# Patient Record
Sex: MALE | Race: WHITE | NOT HISPANIC OR LATINO | URBAN - METROPOLITAN AREA
[De-identification: names, ages, dates, MRNs, and addresses within clinical notes are randomized per-mention and may not be internally consistent; named-entity substitution may affect disease eponyms.]

---

## 2019-04-21 ENCOUNTER — EMERGENCY (EMERGENCY)
Facility: HOSPITAL | Age: 74
LOS: 1 days | Discharge: HOME | End: 2019-04-21
Attending: EMERGENCY MEDICINE
Payer: MEDICARE

## 2019-04-21 VITALS
OXYGEN SATURATION: 99 % | DIASTOLIC BLOOD PRESSURE: 95 MMHG | TEMPERATURE: 98 F | HEIGHT: 66 IN | SYSTOLIC BLOOD PRESSURE: 150 MMHG | RESPIRATION RATE: 16 BRPM | HEART RATE: 100 BPM | WEIGHT: 210.1 LBS

## 2019-04-21 VITALS
RESPIRATION RATE: 18 BRPM | TEMPERATURE: 98 F | SYSTOLIC BLOOD PRESSURE: 144 MMHG | HEART RATE: 92 BPM | DIASTOLIC BLOOD PRESSURE: 85 MMHG | OXYGEN SATURATION: 98 %

## 2019-04-21 LAB
APPEARANCE UR: ABNORMAL
BILIRUB UR-MCNC: NEGATIVE — SIGNIFICANT CHANGE UP
COLOR SPEC: ABNORMAL
DIFF PNL FLD: ABNORMAL
GLUCOSE UR QL: 1000 MG/DL
KETONES UR-MCNC: NEGATIVE — SIGNIFICANT CHANGE UP
LEUKOCYTE ESTERASE UR-ACNC: ABNORMAL
NITRITE UR-MCNC: NEGATIVE — SIGNIFICANT CHANGE UP
PH UR: 5 — SIGNIFICANT CHANGE UP (ref 5–8)
PROT UR-MCNC: 100
SP GR SPEC: 1.02 — SIGNIFICANT CHANGE UP (ref 1.01–1.02)
UROBILINOGEN FLD QL: NEGATIVE — SIGNIFICANT CHANGE UP

## 2019-04-21 PROCEDURE — 99282 EMERGENCY DEPT VISIT SF MDM: CPT | Mod: 25

## 2019-04-21 PROCEDURE — 87086 URINE CULTURE/COLONY COUNT: CPT

## 2019-04-21 PROCEDURE — 81001 URINALYSIS AUTO W/SCOPE: CPT

## 2019-04-21 PROCEDURE — 99283 EMERGENCY DEPT VISIT LOW MDM: CPT

## 2019-04-21 NOTE — ED PROVIDER NOTE - OBJECTIVE STATEMENT
73 y/o M with a significant PMHx of BPH, CKD, DM, HLD and no significant PSHx presents to the ED with complaints of hematuria x today. Patient states he went to his urologist 3 days ago and had a "heat" procedure performed to shrink his prostate; patient was discharged with Diop catheter. Patient reports initial burning by penis however catheter was working normally. Patient states he noticed minimal urine leaking around catheter yesterday. Patient then notes that tonight urine was only leaking around catheter; patient pulled catheter out due to frustration, causing bleeding and slight discomfort by urethra. Patient states he has a 2 day follow up with urologist; was supposed to have catheter removed then. Denies abdominal pain, difficulty urinating, back pain, fever, testicular pain or any other complaints.

## 2019-04-21 NOTE — ED ADULT NURSE NOTE - CHIEF COMPLAINT QUOTE
biba ambulatory from [facility] with c/o some minor bleeding when urinating,he pulled out the pugh catheter

## 2019-04-21 NOTE — ED PROVIDER NOTE - PMH
BPH (benign prostatic hyperplasia)    CKD (chronic kidney disease)    Diabetes    HLD (hyperlipidemia)

## 2019-04-21 NOTE — ED ADULT NURSE REASSESSMENT NOTE - NS ED NURSE REASSESS COMMENT FT1
Patient for discharge back to [facility], [facility] notified,  Bernadette notified, no manager and no nurse on site at this time until the morning as per Bernadette.

## 2019-04-21 NOTE — ED PROVIDER NOTE - CLINICAL SUMMARY MEDICAL DECISION MAKING FREE TEXT BOX
73 y/o M presents to the ED with hematuria after pulling out Diop catheter. Will order UA and observe patient to make sure he is able to urinate. 73 y/o M presents to the ED with hematuria after pulling out Pugh catheter. Will order UA and observe patient to make sure he is able to urinate.  ua with blood but no uti. pt observed in ed, able to urinate without issue. no palpable bladder, no urgency. at this time will not place another pugh since pt with trauma from removing it with balloon inflated. has f/u appt with urology in 2 days. advised to return for signs of urinary retention.

## 2019-04-22 LAB
CULTURE RESULTS: SIGNIFICANT CHANGE UP
SPECIMEN SOURCE: SIGNIFICANT CHANGE UP

## 2021-01-22 PROBLEM — N40.0 BENIGN PROSTATIC HYPERPLASIA WITHOUT LOWER URINARY TRACT SYMPTOMS: Chronic | Status: ACTIVE | Noted: 2019-04-21

## 2021-01-22 PROBLEM — N18.9 CHRONIC KIDNEY DISEASE, UNSPECIFIED: Chronic | Status: ACTIVE | Noted: 2019-04-21

## 2021-01-22 PROBLEM — E78.5 HYPERLIPIDEMIA, UNSPECIFIED: Chronic | Status: ACTIVE | Noted: 2019-04-21

## 2021-01-22 PROBLEM — E11.9 TYPE 2 DIABETES MELLITUS WITHOUT COMPLICATIONS: Chronic | Status: ACTIVE | Noted: 2019-04-21

## 2021-01-26 ENCOUNTER — OUTPATIENT (OUTPATIENT)
Dept: OUTPATIENT SERVICES | Facility: HOSPITAL | Age: 76
LOS: 1 days | End: 2021-01-26
Payer: MEDICARE

## 2021-01-26 ENCOUNTER — APPOINTMENT (OUTPATIENT)
Dept: WOUND CARE | Facility: CLINIC | Age: 76
End: 2021-01-26

## 2021-01-26 VITALS — BODY MASS INDEX: 32.95 KG/M2 | WEIGHT: 205 LBS | HEIGHT: 66 IN

## 2021-01-26 VITALS
OXYGEN SATURATION: 97 % | TEMPERATURE: 97.7 F | HEART RATE: 82 BPM | SYSTOLIC BLOOD PRESSURE: 130 MMHG | RESPIRATION RATE: 18 BRPM | DIASTOLIC BLOOD PRESSURE: 83 MMHG

## 2021-01-26 DIAGNOSIS — E11.9 TYPE 2 DIABETES MELLITUS W/OUT COMPLICATIONS: ICD-10-CM

## 2021-01-26 DIAGNOSIS — L97.529 NON-PRESSURE CHRONIC ULCER OF OTHER PART OF LEFT FOOT WITH UNSPECIFIED SEVERITY: ICD-10-CM

## 2021-01-26 DIAGNOSIS — N40.0 BENIGN PROSTATIC HYPERPLASIA WITHOUT LOWER URINARY TRACT SYMPMS: ICD-10-CM

## 2021-01-26 DIAGNOSIS — Z78.9 OTHER SPECIFIED HEALTH STATUS: ICD-10-CM

## 2021-01-26 DIAGNOSIS — I10 ESSENTIAL (PRIMARY) HYPERTENSION: ICD-10-CM

## 2021-01-26 DIAGNOSIS — Z82.49 FAMILY HISTORY OF ISCHEMIC HEART DISEASE AND OTHER DISEASES OF THE CIRCULATORY SYSTEM: ICD-10-CM

## 2021-01-26 DIAGNOSIS — Z00.00 ENCOUNTER FOR GENERAL ADULT MEDICAL EXAMINATION WITHOUT ABNORMAL FINDINGS: ICD-10-CM

## 2021-01-26 PROCEDURE — G0463: CPT

## 2021-01-26 RX ORDER — FINASTERIDE 5 MG/1
5 TABLET, FILM COATED ORAL
Refills: 0 | Status: ACTIVE | COMMUNITY

## 2021-01-26 RX ORDER — AMLODIPINE BESYLATE 10 MG/1
10 TABLET ORAL
Refills: 0 | Status: ACTIVE | COMMUNITY

## 2021-01-26 RX ORDER — DOCUSATE SODIUM 100 MG/1
100 CAPSULE ORAL
Refills: 0 | Status: ACTIVE | COMMUNITY

## 2021-01-26 RX ORDER — SIMVASTATIN 40 MG/1
40 TABLET, FILM COATED ORAL
Refills: 0 | Status: ACTIVE | COMMUNITY

## 2021-01-26 RX ORDER — FLUTICASONE PROPIONATE 50 UG/1
SPRAY, METERED NASAL
Refills: 0 | Status: ACTIVE | COMMUNITY

## 2021-01-26 RX ORDER — ACETAMINOPHEN 325 MG/1
325 TABLET, FILM COATED ORAL
Refills: 0 | Status: ACTIVE | COMMUNITY

## 2021-01-26 RX ORDER — TAMSULOSIN HYDROCHLORIDE 0.4 MG/1
CAPSULE ORAL
Refills: 0 | Status: ACTIVE | COMMUNITY

## 2021-01-26 RX ORDER — UBIDECARENONE/VIT E ACET 100MG-5
25 MCG CAPSULE ORAL
Refills: 0 | Status: ACTIVE | COMMUNITY

## 2021-01-26 RX ORDER — GLIPIZIDE 2.5 MG/1
TABLET ORAL
Refills: 0 | Status: ACTIVE | COMMUNITY

## 2021-01-26 RX ORDER — DOXAZOSIN 4 MG/1
4 TABLET ORAL
Refills: 0 | Status: ACTIVE | COMMUNITY

## 2021-01-26 NOTE — ASSESSMENT
[FreeTextEntry1] : Physical exam:\par Vascular: DP palpable, PT nonpalpable bilaterally. Absent pedal hair. \par Derm: Over the right 3rd nail bed, extending distally, there is a .7 x 1.0 cm wound, granular, with normokeratotic wound edges. No drainage noted. After shave biopsy taken, healthy bleeding noted. 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

## 2021-01-26 NOTE — HISTORY OF PRESENT ILLNESS
[FreeTextEntry1] : 75 year old patient with history of DMII, HTN presents with complaint of wound over the nail bed of the left 3rd nail. He states the wound has been present for 1 year. He normally goes to podiatrist Dr. Erazo for regular care, and was referred here for possible biopsy. He states that the wound first appeared after he cut his nails by himself. He states that the area is mildly painful. Denies any other symptoms including fevers, chills, nausea, or vomiting. He notes that his sugars have been running high lately, in the 200's

## 2021-01-27 DIAGNOSIS — L97.522 NON-PRESSURE CHRONIC ULCER OF OTHER PART OF LEFT FOOT WITH FAT LAYER EXPOSED: ICD-10-CM

## 2021-01-27 DIAGNOSIS — I87.2 VENOUS INSUFFICIENCY (CHRONIC) (PERIPHERAL): ICD-10-CM

## 2021-02-02 ENCOUNTER — APPOINTMENT (OUTPATIENT)
Dept: WOUND CARE | Facility: CLINIC | Age: 76
End: 2021-02-02

## 2021-02-09 ENCOUNTER — APPOINTMENT (OUTPATIENT)
Dept: WOUND CARE | Facility: CLINIC | Age: 76
End: 2021-02-09

## 2021-02-09 ENCOUNTER — OUTPATIENT (OUTPATIENT)
Dept: OUTPATIENT SERVICES | Facility: HOSPITAL | Age: 76
LOS: 1 days | End: 2021-02-09
Payer: MEDICARE

## 2021-02-09 VITALS
OXYGEN SATURATION: 97 % | DIASTOLIC BLOOD PRESSURE: 102 MMHG | WEIGHT: 205 LBS | RESPIRATION RATE: 18 BRPM | HEART RATE: 106 BPM | HEIGHT: 66 IN | TEMPERATURE: 97 F | SYSTOLIC BLOOD PRESSURE: 164 MMHG | BODY MASS INDEX: 32.95 KG/M2

## 2021-02-09 DIAGNOSIS — Z00.00 ENCOUNTER FOR GENERAL ADULT MEDICAL EXAMINATION WITHOUT ABNORMAL FINDINGS: ICD-10-CM

## 2021-02-09 PROCEDURE — G0463: CPT

## 2021-02-09 NOTE — HISTORY OF PRESENT ILLNESS
[FreeTextEntry1] : 75 year old patient with history of DMII, HTN presents with complaint of wound over the nail bed of the left 3rd nail. He states the wound has been present for 1 year. He normally goes to podiatrist Dr. Erazo for regular care, and was referred here for possible biopsy. He states that the wound first appeared after he cut his nails by himself. He states that the area is mildly painful. Denies any other symptoms including fevers, chills, nausea, or vomiting. He notes that his sugars have been running high lately, in the 200's. shave biopsy results showed some signs of invasive squamous cell carcinoma, but pt in need of punch biopsy.

## 2021-02-09 NOTE — ASSESSMENT
[FreeTextEntry1] : Physical exam:\par Vascular: DP palpable, PT nonpalpable bilaterally. Absent pedal hair. \par Derm: Over the right 3rd nail bed, extending distally, there is a .7 x 1.0 cm wound, granular, with normokeratotic wound edges. No drainage noted. After shave biopsy taken, healthy bleeding noted. No other open wounds\par Neuro: Protective sensation intact\par MSK: able to move all other extremities \par \par Assessment: \par DMII ulceration, r/o possible SCC vs BCC\par \par Plan:\par Patient evaluated, chart reviewed\par Discussed with patient possible etiologies of his wound\par consent obtained for punch biopsy of wound\par Punch biopsy taken of right 3rd digit using disposable biopsy punch placed into specimen cup with formalin.\par Dressed right foot using DSD and ACE compression\par Patient to RTC in 1 week

## 2021-02-10 DIAGNOSIS — E11.622 TYPE 2 DIABETES MELLITUS WITH OTHER SKIN ULCER: ICD-10-CM

## 2021-02-10 DIAGNOSIS — L97.522 NON-PRESSURE CHRONIC ULCER OF OTHER PART OF LEFT FOOT WITH FAT LAYER EXPOSED: ICD-10-CM

## 2021-02-16 LAB — CORE LAB BIOPSY: NORMAL

## 2021-02-23 ENCOUNTER — APPOINTMENT (OUTPATIENT)
Dept: WOUND CARE | Facility: CLINIC | Age: 76
End: 2021-02-23

## 2021-02-23 ENCOUNTER — OUTPATIENT (OUTPATIENT)
Dept: OUTPATIENT SERVICES | Facility: HOSPITAL | Age: 76
LOS: 1 days | End: 2021-02-23
Payer: MEDICARE

## 2021-02-23 VITALS
DIASTOLIC BLOOD PRESSURE: 74 MMHG | TEMPERATURE: 98 F | RESPIRATION RATE: 18 BRPM | OXYGEN SATURATION: 98 % | WEIGHT: 205 LBS | HEIGHT: 66 IN | HEART RATE: 95 BPM | BODY MASS INDEX: 32.95 KG/M2 | SYSTOLIC BLOOD PRESSURE: 112 MMHG

## 2021-02-23 DIAGNOSIS — Z00.00 ENCOUNTER FOR GENERAL ADULT MEDICAL EXAMINATION WITHOUT ABNORMAL FINDINGS: ICD-10-CM

## 2021-02-23 PROCEDURE — G0463: CPT

## 2021-02-23 NOTE — HISTORY OF PRESENT ILLNESS
[FreeTextEntry1] : 75 year old patient with history of DMII, HTN presents with complaint of wound over the nail bed of the left 3rd nail. He states the wound has been present for 1 year. He normally goes to podiatrist Dr. Erazo for regular care, and was referred here for possible biopsy. He states that the wound first appeared after he cut his nails by himself. He states that the area is mildly painful. Denies any other symptoms including fevers, chills, nausea, or vomiting. He notes that his sugars have been running high lately, in the 200's. shave biopsy results showed some signs of invasive squamous cell carcinoma, but pt in need of punch biopsy which was performed and results were discussed with pt on this visit. Pt was referred to oncologist on this visit.

## 2021-02-23 NOTE — ASSESSMENT
[FreeTextEntry1] : Physical exam:\par Vascular: DP palpable, PT nonpalpable bilaterally. Absent pedal hair. \par Derm: Over the right 3rd nail bed, extending distally, there is a .7 x 1.0 cm wound, granular, with normokeratotic wound edges. No drainage noted.  No other open wounds\par Neuro: Protective sensation intact\par MSK: able to move all other extremities \par \par Assessment: \par DMII ulceration, r/o possible SCC vs BCC\par \par Plan:\par Patient evaluated, chart reviewed\par Discussed result of biopsy \par Reffered to Oncologist Dr Sue\par Dressed right foot using DSD and ACE compression\par Patient to RTC after seeing Dr Seu\par \par RTC:2 weeks

## 2021-02-24 DIAGNOSIS — C44.92 SQUAMOUS CELL CARCINOMA OF SKIN, UNSPECIFIED: ICD-10-CM

## 2021-02-25 LAB — CORE LAB BIOPSY: NORMAL

## 2021-03-23 ENCOUNTER — APPOINTMENT (OUTPATIENT)
Dept: SURGICAL ONCOLOGY | Facility: CLINIC | Age: 76
End: 2021-03-23
Payer: MEDICARE

## 2021-03-23 VITALS
SYSTOLIC BLOOD PRESSURE: 153 MMHG | TEMPERATURE: 98.7 F | WEIGHT: 202 LBS | OXYGEN SATURATION: 96 % | BODY MASS INDEX: 32.47 KG/M2 | DIASTOLIC BLOOD PRESSURE: 92 MMHG | HEART RATE: 102 BPM | HEIGHT: 66 IN

## 2021-03-23 DIAGNOSIS — K40.90 UNILATERAL INGUINAL HERNIA, W/OUT OBSTRUCTION OR GANGRENE, NOT SPECIFIED AS RECURRENT: ICD-10-CM

## 2021-03-23 PROCEDURE — 99205 OFFICE O/P NEW HI 60 MIN: CPT

## 2021-03-23 NOTE — PHYSICAL EXAM
[Normal] : supple, no neck mass and thyroid not enlarged [Normal Groin Lymph Nodes] : normal groin lymph nodes [Normal] : oriented to person, place and time, with appropriate affect [de-identified] : Normal popliteal nodes [de-identified] : left 3rd toe with non-healing ulcer from SSC biopsy

## 2021-03-23 NOTE — CONSULT LETTER
[Dear  ___] : Dear  [unfilled], [Consult Letter:] : I had the pleasure of evaluating your patient, [unfilled]. [Please see my note below.] : Please see my note below. [Consult Closing:] : Thank you very much for allowing me to participate in the care of this patient.  If you have any questions, please do not hesitate to contact me. [Sincerely,] : Sincerely, [FreeTextEntry2] : Usman Carballo MD  [FreeTextEntry1] : I will notify you of the date of the surgery so that you can see him for pre-operative evaluation. [FreeTextEntry3] : Hamilton Altamirano MD FACS\par Chief of Surgical Oncology\par \par

## 2021-03-23 NOTE — ASSESSMENT
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 \par \par \par PLAN:\par left 3rd toe amputation

## 2021-03-23 NOTE — HISTORY OF PRESENT ILLNESS
[de-identified] : Travis Greenberg is a 76 year old male who presents today for initial consultation. He was referred by Dr. Usman Carballo. 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 His past medical history includes BPH, DM II-insulin dependent, hypertension, inguinal hernia x 2, and hyperlipidemia.  He was recently hospitalized due to an unresponsive hypoglycemic episode. He currently receiving  wound care daily. He denies a personal or family history of cancer. \par \par Referring MD:  Usman Carballo MD

## 2021-04-30 ENCOUNTER — OUTPATIENT (OUTPATIENT)
Dept: OUTPATIENT SERVICES | Facility: HOSPITAL | Age: 76
LOS: 1 days | End: 2021-04-30
Payer: MEDICARE

## 2021-04-30 VITALS
HEIGHT: 65 IN | RESPIRATION RATE: 18 BRPM | WEIGHT: 203.93 LBS | DIASTOLIC BLOOD PRESSURE: 80 MMHG | OXYGEN SATURATION: 97 % | TEMPERATURE: 99 F | HEART RATE: 86 BPM | SYSTOLIC BLOOD PRESSURE: 137 MMHG

## 2021-04-30 DIAGNOSIS — E11.9 TYPE 2 DIABETES MELLITUS WITHOUT COMPLICATIONS: ICD-10-CM

## 2021-04-30 DIAGNOSIS — Z01.818 ENCOUNTER FOR OTHER PREPROCEDURAL EXAMINATION: ICD-10-CM

## 2021-04-30 DIAGNOSIS — Z98.890 OTHER SPECIFIED POSTPROCEDURAL STATES: Chronic | ICD-10-CM

## 2021-04-30 DIAGNOSIS — C44.729 SQUAMOUS CELL CARCINOMA OF SKIN OF LEFT LOWER LIMB, INCLUDING HIP: ICD-10-CM

## 2021-04-30 DIAGNOSIS — N18.9 CHRONIC KIDNEY DISEASE, UNSPECIFIED: ICD-10-CM

## 2021-04-30 DIAGNOSIS — J30.2 OTHER SEASONAL ALLERGIC RHINITIS: ICD-10-CM

## 2021-04-30 DIAGNOSIS — N40.0 BENIGN PROSTATIC HYPERPLASIA WITHOUT LOWER URINARY TRACT SYMPTOMS: ICD-10-CM

## 2021-04-30 DIAGNOSIS — M25.561 PAIN IN RIGHT KNEE: ICD-10-CM

## 2021-04-30 DIAGNOSIS — M54.5 LOW BACK PAIN: ICD-10-CM

## 2021-04-30 DIAGNOSIS — C44.92 SQUAMOUS CELL CARCINOMA OF SKIN, UNSPECIFIED: ICD-10-CM

## 2021-04-30 DIAGNOSIS — I10 ESSENTIAL (PRIMARY) HYPERTENSION: ICD-10-CM

## 2021-04-30 LAB — BLD GP AB SCN SERPL QL: SIGNIFICANT CHANGE UP

## 2021-04-30 PROCEDURE — 86900 BLOOD TYPING SEROLOGIC ABO: CPT

## 2021-04-30 PROCEDURE — 86901 BLOOD TYPING SEROLOGIC RH(D): CPT

## 2021-04-30 PROCEDURE — 36415 COLL VENOUS BLD VENIPUNCTURE: CPT

## 2021-04-30 PROCEDURE — 86850 RBC ANTIBODY SCREEN: CPT

## 2021-04-30 PROCEDURE — G0463: CPT

## 2021-04-30 NOTE — H&P PST ADULT - NSICDXFAMILYHX_GEN_ALL_CORE_FT
FAMILY HISTORY:  Father  Still living? Unknown  Family history of cardiovascular disease, Age at diagnosis: Age Unknown    Mother  Still living? Unknown  Family history of cardiovascular disease, Age at diagnosis: Age Unknown    Sibling  Still living? Unknown  Family history of cardiovascular disease, Age at diagnosis: Age Unknown

## 2021-04-30 NOTE — H&P PST ADULT - NEGATIVE NEUROLOGICAL SYMPTOMS
no weakness/no generalized seizures/no focal seizures/no syncope/no tremors/no vertigo/no difficulty walking

## 2021-04-30 NOTE — H&P PST ADULT - NSANTHOSAYNRD_GEN_A_CORE
No. ANTONY screening performed.  STOP BANG Legend: 0-2 = LOW Risk; 3-4 = INTERMEDIATE Risk; 5-8 = HIGH Risk

## 2021-04-30 NOTE — H&P PST ADULT - PRO PAIN EXPRESSION
Const: Denies fever, chills  HEENT: Denies blurry vision, sore throat  Neck: Denies neck pain/stiffness  Resp: Denies coughing, SOB  Cardiovascular: Denies CP, palpitations, LE edema  GI: Denies (+) nausea, vomiting, diarrhea, abdominal pain, Denies constipation, blood in stool  : Denies urinary frequency/urgency/dysuria, hematuria  MSK: Denies back pain  Neuro: Denies HA, dizziness, numbness, weakness  Skin: Denies rashes. Const: Denies fever, chills  HEENT: Denies blurry vision, sore throat  Neck: Denies neck pain/stiffness  Resp: Denies coughing, SOB  Cardiovascular: Denies CP, palpitations, LE edema  GI: Denies (+) nausea, + vomiting, + diarrhea, + abdominal pain, Denies constipation, blood in stool  : Denies urinary frequency/urgency/dysuria, hematuria  MSK: Denies back pain  Neuro: Denies HA, dizziness, numbness, weakness  Skin: Denies rashes. verbalization

## 2021-04-30 NOTE — H&P PST ADULT - HISTORY OF PRESENT ILLNESS
76 yr old male with PMH of Diabetes, HTN,  76 yr old male with PMH of anxiety, BPH, Diabetes, HTN, Hyperlipidemia, CKD, low back pain, OA of knees, bilateral inguinal hernia presents with c/o left 3rd toe nail cancer. Pt reports that he had a wound over the nail bed of the 3rd toe for about 1 year that never healed. Wound appeared after pt cut his nails by himself. Biopsy of wound revealed squamous cell carcinoma. Pt is scheduled for left 3rd toe amputation on 05/10/2021.

## 2021-04-30 NOTE — H&P PST ADULT - NEGATIVE GASTROINTESTINAL SYMPTOMS
no nausea/no vomiting/no diarrhea/no change in bowel habits/no flatulence/no abdominal pain/no melena

## 2021-04-30 NOTE — H&P PST ADULT - NSICDXPASTSURGICALHX_GEN_ALL_CORE_FT
PAST SURGICAL HISTORY:  History of biopsy shave biopsy of toenail on 1/26/2021 and punch biopsy on 2/9/2021    History of prostate biopsy

## 2021-04-30 NOTE — H&P PST ADULT - NSICDXPROBLEM_GEN_ALL_CORE_FT
PROBLEM DIAGNOSES  Problem: SCC (squamous cell carcinoma)  Assessment and Plan: Left 3rd toe amputation on 05/01/2021. Preoperative instructions discussed with pt and given to pt. Instructed pt that no one will be allowed to come to hospital with him, to notify security when he arrives in the lobby of the hospital that he is here for surgery, that he will need someone to come to the hospital to pick him up after surgery,not to eat or drink anything after midnight the night before the surgery, to avoid NSAIDs such as Ibuprofen, Motrin, Aleve, Advil, naproxen before surgery, to take Tylenol if needed for pain, to report if he has been exposed to anyone with any contagious diseases including Covid-19 or if he is exhibiting any symptoms of COVID-19,  to keep appointment for COVID-19  test 3 days before surgery. Instructed about use of Chlorhexidine 4% soap before surgery. Verbalized understanding of instructions given.     Problem: HTN (hypertension)  Assessment and Plan: Instructed to continue amlodipine and take with sips of water on day of surgery.  Cleared by PCP. Follow-up with PCP for management.     Problem: DM (diabetes mellitus)  Assessment and Plan: Instructed to continue Glipizide and to hold on day of surgery, to take Levemir on day of surgery. Perioperative glucose monitoring and coverage as needed. Follow-up with PCP for diabetic management     Problem: CKD (chronic kidney disease)  Assessment and Plan: Avoid NSAIDs perioperativetively.     Problem: Seasonal allergies  Assessment and Plan: Instructed to continue flonase as needed and to follow-up with PCP for allergy management.     Problem: Low back pain  Assessment and Plan: Pt instructed to avoid NSAIDs 1 week before surgery.  Advised to take Tylenol as needed for pain. Stated understanding of instructions given.     Problem: Pain in both knees  Assessment and Plan: Pt instructed to avoid NSAIDs 1 week before surgery.  Advised to take Tylenol as needed for pain. Stated understanding of instructions given.     Problem: BPH (benign prostatic hyperplasia)  Assessment and Plan: Instructed to continue meds and take Finasteride with sips of water on day of surgery. Follow-up with provider for management.

## 2021-04-30 NOTE — H&P PST ADULT - NSICDXPASTMEDICALHX_GEN_ALL_CORE_FT
PAST MEDICAL HISTORY:  BPH (benign prostatic hyperplasia)     CKD (chronic kidney disease)     Diabetes     HLD (hyperlipidemia)     HTN (hypertension)

## 2021-05-07 ENCOUNTER — APPOINTMENT (OUTPATIENT)
Dept: DISASTER EMERGENCY | Facility: CLINIC | Age: 76
End: 2021-05-07

## 2021-05-08 LAB — SARS-COV-2 N GENE NPH QL NAA+PROBE: NOT DETECTED

## 2021-05-09 ENCOUNTER — TRANSCRIPTION ENCOUNTER (OUTPATIENT)
Age: 76
End: 2021-05-09

## 2021-05-10 ENCOUNTER — OUTPATIENT (OUTPATIENT)
Dept: OUTPATIENT SERVICES | Facility: HOSPITAL | Age: 76
LOS: 1 days | End: 2021-05-10
Payer: MEDICARE

## 2021-05-10 ENCOUNTER — APPOINTMENT (OUTPATIENT)
Dept: SURGICAL ONCOLOGY | Facility: HOSPITAL | Age: 76
End: 2021-05-10

## 2021-05-10 VITALS
HEART RATE: 107 BPM | TEMPERATURE: 98 F | RESPIRATION RATE: 18 BRPM | WEIGHT: 203.93 LBS | DIASTOLIC BLOOD PRESSURE: 72 MMHG | SYSTOLIC BLOOD PRESSURE: 124 MMHG | HEIGHT: 65 IN | OXYGEN SATURATION: 99 %

## 2021-05-10 DIAGNOSIS — Z98.890 OTHER SPECIFIED POSTPROCEDURAL STATES: Chronic | ICD-10-CM

## 2021-05-10 DIAGNOSIS — C44.729 SQUAMOUS CELL CARCINOMA OF SKIN OF LEFT LOWER LIMB, INCLUDING HIP: ICD-10-CM

## 2021-05-10 LAB
BLD GP AB SCN SERPL QL: SIGNIFICANT CHANGE UP
GLUCOSE BLDC GLUCOMTR-MCNC: 324 MG/DL — HIGH (ref 70–99)
GLUCOSE BLDC GLUCOMTR-MCNC: 339 MG/DL — HIGH (ref 70–99)
GLUCOSE BLDC GLUCOMTR-MCNC: 339 MG/DL — HIGH (ref 70–99)
GLUCOSE BLDC GLUCOMTR-MCNC: 367 MG/DL — HIGH (ref 70–99)
GLUCOSE BLDC GLUCOMTR-MCNC: 370 MG/DL — HIGH (ref 70–99)

## 2021-05-10 PROCEDURE — 86900 BLOOD TYPING SEROLOGIC ABO: CPT

## 2021-05-10 PROCEDURE — 86901 BLOOD TYPING SEROLOGIC RH(D): CPT

## 2021-05-10 PROCEDURE — 36415 COLL VENOUS BLD VENIPUNCTURE: CPT

## 2021-05-10 PROCEDURE — 82962 GLUCOSE BLOOD TEST: CPT

## 2021-05-10 PROCEDURE — 28810 AMPUTATION TOE & METATARSAL: CPT | Mod: T2,73

## 2021-05-10 PROCEDURE — 86850 RBC ANTIBODY SCREEN: CPT

## 2021-05-10 RX ORDER — GLUCAGON INJECTION, SOLUTION 0.5 MG/.1ML
1 INJECTION, SOLUTION SUBCUTANEOUS ONCE
Refills: 0 | Status: DISCONTINUED | OUTPATIENT
Start: 2021-05-10 | End: 2021-05-17

## 2021-05-10 RX ORDER — SODIUM CHLORIDE 9 MG/ML
3 INJECTION INTRAMUSCULAR; INTRAVENOUS; SUBCUTANEOUS EVERY 8 HOURS
Refills: 0 | Status: DISCONTINUED | OUTPATIENT
Start: 2021-05-10 | End: 2021-05-10

## 2021-05-10 RX ORDER — DEXTROSE 50 % IN WATER 50 %
12.5 SYRINGE (ML) INTRAVENOUS ONCE
Refills: 0 | Status: DISCONTINUED | OUTPATIENT
Start: 2021-05-10 | End: 2021-05-17

## 2021-05-10 RX ORDER — ALBUTEROL 90 UG/1
2 AEROSOL, METERED ORAL
Qty: 0 | Refills: 0 | DISCHARGE

## 2021-05-10 RX ORDER — INSULIN LISPRO 100/ML
10 VIAL (ML) SUBCUTANEOUS
Refills: 0 | Status: DISCONTINUED | OUTPATIENT
Start: 2021-05-10 | End: 2021-05-17

## 2021-05-10 RX ORDER — DEXTROSE 50 % IN WATER 50 %
25 SYRINGE (ML) INTRAVENOUS ONCE
Refills: 0 | Status: DISCONTINUED | OUTPATIENT
Start: 2021-05-10 | End: 2021-05-17

## 2021-05-10 RX ORDER — INSULIN GLARGINE 100 [IU]/ML
20 INJECTION, SOLUTION SUBCUTANEOUS ONCE
Refills: 0 | Status: COMPLETED | OUTPATIENT
Start: 2021-05-10 | End: 2021-05-10

## 2021-05-10 RX ORDER — INSULIN LISPRO 100/ML
VIAL (ML) SUBCUTANEOUS
Refills: 0 | Status: DISCONTINUED | OUTPATIENT
Start: 2021-05-10 | End: 2021-05-17

## 2021-05-10 RX ORDER — INSULIN HUMAN 100 [IU]/ML
10 INJECTION, SOLUTION SUBCUTANEOUS ONCE
Refills: 0 | Status: COMPLETED | OUTPATIENT
Start: 2021-05-10 | End: 2021-05-10

## 2021-05-10 RX ORDER — SODIUM CHLORIDE 9 MG/ML
1000 INJECTION, SOLUTION INTRAVENOUS
Refills: 0 | Status: DISCONTINUED | OUTPATIENT
Start: 2021-05-10 | End: 2021-05-17

## 2021-05-10 RX ORDER — DEXTROSE 50 % IN WATER 50 %
15 SYRINGE (ML) INTRAVENOUS ONCE
Refills: 0 | Status: DISCONTINUED | OUTPATIENT
Start: 2021-05-10 | End: 2021-05-17

## 2021-05-10 RX ORDER — INSULIN DETEMIR 100/ML (3)
20 INSULIN PEN (ML) SUBCUTANEOUS ONCE
Refills: 0 | Status: DISCONTINUED | OUTPATIENT
Start: 2021-05-10 | End: 2021-05-10

## 2021-05-10 RX ADMIN — SODIUM CHLORIDE 3 MILLILITER(S): 9 INJECTION INTRAMUSCULAR; INTRAVENOUS; SUBCUTANEOUS at 09:19

## 2021-05-10 RX ADMIN — INSULIN GLARGINE 20 UNIT(S): 100 INJECTION, SOLUTION SUBCUTANEOUS at 10:45

## 2021-05-10 RX ADMIN — INSULIN HUMAN 10 UNIT(S): 100 INJECTION, SOLUTION SUBCUTANEOUS at 09:29

## 2021-05-10 NOTE — ASU PATIENT PROFILE, ADULT - PMH
BPH (benign prostatic hyperplasia)    CKD (chronic kidney disease)    Diabetes    HLD (hyperlipidemia)    HTN (hypertension)

## 2021-05-10 NOTE — PACU DISCHARGE NOTE - COMMENTS
Patient blood sugar 339 on presentation to the hospital and went up to 370. Patient received regular insulin 10 and lantus 20 units. Patient's blood sugar is 320 now and is trending down. He is stable to be discharged to his assisted care facility.

## 2021-05-10 NOTE — ASU PATIENT PROFILE, ADULT - PSH
History of biopsy  shave biopsy of toenail on 1/26/2021 and punch biopsy on 2/9/2021  History of prostate biopsy

## 2021-05-13 DIAGNOSIS — Z01.818 ENCOUNTER FOR OTHER PREPROCEDURAL EXAMINATION: ICD-10-CM

## 2021-05-13 PROBLEM — I10 ESSENTIAL (PRIMARY) HYPERTENSION: Chronic | Status: ACTIVE | Noted: 2021-04-30

## 2021-05-14 ENCOUNTER — APPOINTMENT (OUTPATIENT)
Dept: DISASTER EMERGENCY | Facility: CLINIC | Age: 76
End: 2021-05-14

## 2021-05-15 LAB — SARS-COV-2 N GENE NPH QL NAA+PROBE: NOT DETECTED

## 2021-05-16 ENCOUNTER — TRANSCRIPTION ENCOUNTER (OUTPATIENT)
Age: 76
End: 2021-05-16

## 2021-05-17 ENCOUNTER — OUTPATIENT (OUTPATIENT)
Dept: OUTPATIENT SERVICES | Facility: HOSPITAL | Age: 76
LOS: 1 days | End: 2021-05-17
Payer: MEDICARE

## 2021-05-17 ENCOUNTER — APPOINTMENT (OUTPATIENT)
Dept: SURGICAL ONCOLOGY | Facility: HOSPITAL | Age: 76
End: 2021-05-17

## 2021-05-17 ENCOUNTER — RESULT REVIEW (OUTPATIENT)
Age: 76
End: 2021-05-17

## 2021-05-17 VITALS
HEART RATE: 95 BPM | OXYGEN SATURATION: 96 % | RESPIRATION RATE: 17 BRPM | SYSTOLIC BLOOD PRESSURE: 140 MMHG | DIASTOLIC BLOOD PRESSURE: 89 MMHG | TEMPERATURE: 98 F

## 2021-05-17 VITALS
RESPIRATION RATE: 16 BRPM | WEIGHT: 203.93 LBS | HEIGHT: 65 IN | OXYGEN SATURATION: 99 % | DIASTOLIC BLOOD PRESSURE: 92 MMHG | TEMPERATURE: 98 F | HEART RATE: 106 BPM | SYSTOLIC BLOOD PRESSURE: 147 MMHG

## 2021-05-17 DIAGNOSIS — C44.729 SQUAMOUS CELL CARCINOMA OF SKIN OF LEFT LOWER LIMB, INCLUDING HIP: ICD-10-CM

## 2021-05-17 DIAGNOSIS — Z98.890 OTHER SPECIFIED POSTPROCEDURAL STATES: Chronic | ICD-10-CM

## 2021-05-17 LAB
GLUCOSE BLDC GLUCOMTR-MCNC: 222 MG/DL — HIGH (ref 70–99)
GLUCOSE BLDC GLUCOMTR-MCNC: 247 MG/DL — HIGH (ref 70–99)
GLUCOSE BLDC GLUCOMTR-MCNC: 274 MG/DL — HIGH (ref 70–99)
GLUCOSE BLDC GLUCOMTR-MCNC: 340 MG/DL — HIGH (ref 70–99)
GLUCOSE BLDC GLUCOMTR-MCNC: 354 MG/DL — HIGH (ref 70–99)

## 2021-05-17 PROCEDURE — 28820 AMPUTATION OF TOE: CPT | Mod: T2

## 2021-05-17 PROCEDURE — 82962 GLUCOSE BLOOD TEST: CPT

## 2021-05-17 PROCEDURE — 28047 RESECT FOOT/TOE TUMOR 3 CM/>: CPT

## 2021-05-17 PROCEDURE — 88305 TISSUE EXAM BY PATHOLOGIST: CPT

## 2021-05-17 PROCEDURE — 88305 TISSUE EXAM BY PATHOLOGIST: CPT | Mod: 26

## 2021-05-17 RX ORDER — OXYCODONE AND ACETAMINOPHEN 5; 325 MG/1; MG/1
1 TABLET ORAL EVERY 4 HOURS
Refills: 0 | Status: DISCONTINUED | OUTPATIENT
Start: 2021-05-17 | End: 2021-05-17

## 2021-05-17 RX ORDER — FENTANYL CITRATE 50 UG/ML
25 INJECTION INTRAVENOUS
Refills: 0 | Status: DISCONTINUED | OUTPATIENT
Start: 2021-05-17 | End: 2021-05-17

## 2021-05-17 RX ORDER — INSULIN LISPRO 100/ML
5 VIAL (ML) SUBCUTANEOUS ONCE
Refills: 0 | Status: COMPLETED | OUTPATIENT
Start: 2021-05-17 | End: 2021-05-17

## 2021-05-17 RX ORDER — SODIUM CHLORIDE 9 MG/ML
3 INJECTION INTRAMUSCULAR; INTRAVENOUS; SUBCUTANEOUS EVERY 8 HOURS
Refills: 0 | Status: DISCONTINUED | OUTPATIENT
Start: 2021-05-17 | End: 2021-05-17

## 2021-05-17 RX ADMIN — Medication 5 UNIT(S): at 10:29

## 2021-05-17 RX ADMIN — SODIUM CHLORIDE 3 MILLILITER(S): 9 INJECTION INTRAMUSCULAR; INTRAVENOUS; SUBCUTANEOUS at 10:29

## 2021-05-17 NOTE — ASU DISCHARGE PLAN (ADULT/PEDIATRIC) - ASU DC SPECIAL INSTRUCTIONSFT
- for postoperative pain, take over the counter pain medications (alternate tylenol and ibuprofen/motrin as needed every 4-6 hours as recommended on the bottle)

## 2021-05-17 NOTE — ASU DISCHARGE PLAN (ADULT/PEDIATRIC) - CARE PROVIDER_API CALL
Hamilton Altamirano)  Surgery  [address] [place] [address], [place] [address]  Phone: [phone]  Fax: [phone]  Established Patient  Follow Up Time: 2 weeks

## 2021-05-24 LAB — SURGICAL PATHOLOGY STUDY: SIGNIFICANT CHANGE UP

## 2021-06-01 ENCOUNTER — APPOINTMENT (OUTPATIENT)
Dept: SURGICAL ONCOLOGY | Facility: CLINIC | Age: 76
End: 2021-06-01
Payer: MEDICARE

## 2021-06-01 VITALS — TEMPERATURE: 96.2 F

## 2021-06-01 VITALS — HEART RATE: 99 BPM | DIASTOLIC BLOOD PRESSURE: 84 MMHG | SYSTOLIC BLOOD PRESSURE: 130 MMHG

## 2021-06-01 PROCEDURE — 99024 POSTOP FOLLOW-UP VISIT: CPT

## 2021-06-01 NOTE — CONSULT LETTER
[Dear  ___] : Dear  [unfilled], [Courtesy Letter:] : I had the pleasure of seeing your patient, [unfilled], in my office today. [Please see my note below.] : Please see my note below. [Consult Closing:] : Thank you very much for allowing me to participate in the care of this patient.  If you have any questions, please do not hesitate to contact me. [Sincerely,] : Sincerely, [FreeTextEntry1] : He will follow-up with both of us. [FreeTextEntry3] : Hamilton Altamirano MD FACS\par Chief of Surgical Oncology\par \par

## 2021-06-01 NOTE — HISTORY OF PRESENT ILLNESS
[de-identified] : Travis Greenberg is a 76 year old male who presents today for initial post operative visit. He is s/p radical resection of squamous cell carcinoma of left third toe  on 5/17/21. Final pathology revealed a diagnosis of invasive squamous cell carcinoma, well-differentiated, tumor extends to the reticular dermis, lympho-vascular permeation is not identified, margins of resection and underlying bone are uninvolved.\par \par Pertinent History: \par Nail bed wound, left 3rd toe biopsy 1/26/21: Highly atypical squamous cells in a minute fragment of ulcerated tissue with fibrinopurulent exudate.\par \par Left 3rd toe, punch biopsy 2/16/21: Squamous cell carcinoma, invasive, extending to all  margins.\par \par He recently presented to both podiatry and orthopedics for evaluation of a left 3rd toe ulceration, he states this has been present for approximately 1 year. His past medical history includes BPH, DM II-insulin dependent, hypertension, inguinal hernia x 2, and hyperlipidemia.  He was recently hospitalized due to an unresponsive hypoglycemic episode. He currently receiving  wound care daily. He denies a personal or family history of cancer. \par \par Referring MD:  Usman Carballo MD

## 2021-06-01 NOTE — REASON FOR VISIT
[Post-Op] : a post-op for [FreeTextEntry2] : s/p radical resection of squamous cell carcinoma of left third toe 5/17/21

## 2021-06-01 NOTE — ASSESSMENT
[FreeTextEntry1] : IMP:\par s/p radical resection of squamous cell carcinoma of left third toe  on 5/17/21\par PATH: invasive squamous cell carcinoma, well-differentiated, tumor extends to the reticular dermis, lympho-vascular permeation is not identified, margins of resection and underlying bone are uninvolved.\par \par \par PLAN:\par RTO 3 months

## 2021-09-14 ENCOUNTER — APPOINTMENT (OUTPATIENT)
Dept: SURGICAL ONCOLOGY | Facility: CLINIC | Age: 76
End: 2021-09-14
Payer: MEDICARE

## 2021-09-14 VITALS
BODY MASS INDEX: 32.14 KG/M2 | OXYGEN SATURATION: 99 % | DIASTOLIC BLOOD PRESSURE: 86 MMHG | WEIGHT: 200 LBS | HEART RATE: 101 BPM | TEMPERATURE: 97.6 F | SYSTOLIC BLOOD PRESSURE: 137 MMHG | HEIGHT: 66 IN

## 2021-09-14 PROCEDURE — 99214 OFFICE O/P EST MOD 30 MIN: CPT

## 2021-09-14 NOTE — HISTORY OF PRESENT ILLNESS
[de-identified] : Travis Greenberg is a 76 year old male who presents today for initial post operative visit. He is s/p radical resection of squamous cell carcinoma of left third toe  on 5/17/21. Final pathology revealed a diagnosis of invasive squamous cell carcinoma, well-differentiated, tumor extends to the reticular dermis, lymphovascular permeation is not identified, margins of resection and underlying bone are uninvolved.\par \par Pertinent History: \par Nail bed wound, left 3rd toe biopsy 1/26/21: Highly atypical squamous cells in a minute fragment of ulcerated tissue with fibrinopurulent exudate.\par \par Left 3rd toe, punch biopsy 2/16/21: Squamous cell carcinoma, invasive, extending to all  margins.\par \par He recently presented to both podiatry and orthopedics for evaluation of a left 3rd toe ulceration, he states this has been present for approximately 1 year. His past medical history includes BPH, DM II-insulin dependent, hypertension, inguinal hernia x 2, and hyperlipidemia.  He was recently hospitalized due to an unresponsive hypoglycemic episode. He currently receiving  wound care daily. He denies a personal or family history of cancer. \par \par Today, he states he is feeling well. He is able to ambulate well and has increased his activity level. \par \par Referring MD:  Usman Carballo MD

## 2021-09-14 NOTE — CONSULT LETTER
[Dear  ___] : Dear  [unfilled], [Courtesy Letter:] : I had the pleasure of seeing your patient, [unfilled], in my office today. [Consult Closing:] : Thank you very much for allowing me to participate in the care of this patient.  If you have any questions, please do not hesitate to contact me. [Please see my note below.] : Please see my note below. [Sincerely,] : Sincerely, [FreeTextEntry1] : He will follow-up with both of us. [FreeTextEntry3] : Hamilton Altamirano MD FACS\par Chief of Surgical Oncology\par \par

## 2021-09-14 NOTE — PHYSICAL EXAM
[Normal] : supple, no neck mass and thyroid not enlarged [Normal Groin Lymph Nodes] : normal groin lymph nodes [Normal] : oriented to person, place and time, with appropriate affect [de-identified] : Normal popliteal nodes [de-identified] : left 3rd toes amputation site clear

## 2021-09-14 NOTE — ASSESSMENT
[FreeTextEntry1] : IMP:\par s/p radical resection of squamous cell carcinoma of left third toe  on 5/17/21\par PATH: invasive squamous cell carcinoma, well-differentiated, tumor extends to the reticular dermis, lympho-vascular permeation is not identified, margins of resection and underlying bone are uninvolved.\par \par \par PLAN:\par RTO 3 months; then q 6 months

## 2021-09-14 NOTE — REASON FOR VISIT
[Follow-Up Visit] : a follow-up visit for [Skin Cancer] : skin caner [FreeTextEntry2] : s/p radical resection of squamous cell carcinoma of left third toe 5/17/21

## 2021-10-12 ENCOUNTER — INPATIENT (INPATIENT)
Facility: HOSPITAL | Age: 76
LOS: 2 days | Discharge: NURSING, REHAB OR LONG-TERM CARE | DRG: 312 | End: 2021-10-15
Attending: INTERNAL MEDICINE | Admitting: INTERNAL MEDICINE
Payer: MEDICARE

## 2021-10-12 VITALS
OXYGEN SATURATION: 100 % | HEART RATE: 110 BPM | DIASTOLIC BLOOD PRESSURE: 76 MMHG | SYSTOLIC BLOOD PRESSURE: 127 MMHG | TEMPERATURE: 98 F | WEIGHT: 190.04 LBS | HEIGHT: 65 IN | RESPIRATION RATE: 16 BRPM

## 2021-10-12 DIAGNOSIS — Z98.890 OTHER SPECIFIED POSTPROCEDURAL STATES: Chronic | ICD-10-CM

## 2021-10-12 LAB
BASOPHILS # BLD AUTO: 0.06 K/UL — SIGNIFICANT CHANGE UP (ref 0–0.2)
BASOPHILS NFR BLD AUTO: 0.5 % — SIGNIFICANT CHANGE UP (ref 0–2)
EOSINOPHIL # BLD AUTO: 0.03 K/UL — SIGNIFICANT CHANGE UP (ref 0–0.5)
EOSINOPHIL NFR BLD AUTO: 0.2 % — SIGNIFICANT CHANGE UP (ref 0–6)
HCT VFR BLD CALC: 44 % — SIGNIFICANT CHANGE UP (ref 39–50)
HGB BLD-MCNC: 14.4 G/DL — SIGNIFICANT CHANGE UP (ref 13–17)
IMM GRANULOCYTES NFR BLD AUTO: 0.5 % — SIGNIFICANT CHANGE UP (ref 0–1.5)
LYMPHOCYTES # BLD AUTO: 0.79 K/UL — LOW (ref 1–3.3)
LYMPHOCYTES # BLD AUTO: 6.1 % — LOW (ref 13–44)
MCHC RBC-ENTMCNC: 27.4 PG — SIGNIFICANT CHANGE UP (ref 27–34)
MCHC RBC-ENTMCNC: 32.7 GM/DL — SIGNIFICANT CHANGE UP (ref 32–36)
MCV RBC AUTO: 83.7 FL — SIGNIFICANT CHANGE UP (ref 80–100)
MONOCYTES # BLD AUTO: 0.63 K/UL — SIGNIFICANT CHANGE UP (ref 0–0.9)
MONOCYTES NFR BLD AUTO: 4.9 % — SIGNIFICANT CHANGE UP (ref 2–14)
NEUTROPHILS # BLD AUTO: 11.29 K/UL — HIGH (ref 1.8–7.4)
NEUTROPHILS NFR BLD AUTO: 87.8 % — HIGH (ref 43–77)
NRBC # BLD: 0 /100 WBCS — SIGNIFICANT CHANGE UP (ref 0–0)
PLATELET # BLD AUTO: 169 K/UL — SIGNIFICANT CHANGE UP (ref 150–400)
RBC # BLD: 5.26 M/UL — SIGNIFICANT CHANGE UP (ref 4.2–5.8)
RBC # FLD: 13.5 % — SIGNIFICANT CHANGE UP (ref 10.3–14.5)
WBC # BLD: 12.86 K/UL — HIGH (ref 3.8–10.5)
WBC # FLD AUTO: 12.86 K/UL — HIGH (ref 3.8–10.5)

## 2021-10-12 PROCEDURE — 71046 X-RAY EXAM CHEST 2 VIEWS: CPT | Mod: 26

## 2021-10-12 PROCEDURE — 93010 ELECTROCARDIOGRAM REPORT: CPT

## 2021-10-12 PROCEDURE — 99284 EMERGENCY DEPT VISIT MOD MDM: CPT

## 2021-10-12 RX ORDER — SODIUM CHLORIDE 9 MG/ML
1000 INJECTION INTRAMUSCULAR; INTRAVENOUS; SUBCUTANEOUS ONCE
Refills: 0 | Status: COMPLETED | OUTPATIENT
Start: 2021-10-12 | End: 2021-10-12

## 2021-10-12 RX ADMIN — SODIUM CHLORIDE 1000 MILLILITER(S): 9 INJECTION INTRAMUSCULAR; INTRAVENOUS; SUBCUTANEOUS at 23:58

## 2021-10-12 NOTE — ED PROVIDER NOTE - OBJECTIVE STATEMENT
76 yr old male from [facility] with hx of anxiety, BPH, Diabetes, HTN, Hyperlipidemia, CKD, low back pain, OA of knees, bilateral inguinal hernia presents to ed c/o near syncope around 7p. pt lasted a few seconds. pt took insulin at noon and had a small meal and since then hasn't eaten anything since. pt felt woozy and tired then almost passed out while at pharmacy. no cp, no headache, no fall, no abd pain, no n/v, no sob, no focal weakness, no incontience. pt asx now.

## 2021-10-12 NOTE — ED ADULT TRIAGE NOTE - CHIEF COMPLAINT QUOTE
BIBA for syncopal episode, presently a&ox3, ambulates steadily, denies any pain or any complaints, states maybe it was because his last meal was at noon, pt is diabetic, ems report BG=160, pt is a [facility] resident and was at a local store near residence when he syncopized, pt had his covid booster vaccine today

## 2021-10-12 NOTE — ED PROVIDER NOTE - PROGRESS NOTE DETAILS
patient signedout to me by Dr. Euceda, Awaiting labs.  labs show neg trop, ddimer 608, Cr 2 thus unable to obtain CTA  On reeval patient denies chest pain or dizziness at this time, on exam patient has persistent tachycardia 100-110bpm  Discussed above with Dr. Carballo who recommends admission under Dr. Phelan's service.  Discussed above with Dr. Phelan who agrees with plan for admission, recommends 1x dose of lovenox 60mg and VQ study later today. Tod FRANKEL

## 2021-10-12 NOTE — ED PROVIDER NOTE - NEUROLOGICAL, MLM
Alert and oriented, no focal deficits, no motor or sensory deficits. CN II-XIi intact, no dysmetria, non-ataxic gait

## 2021-10-12 NOTE — ED PROVIDER NOTE - CLINICAL SUMMARY MEDICAL DECISION MAKING FREE TEXT BOX
76 yr old male from [facility] with hx of anxiety, BPH, Diabetes, HTN, Hyperlipidemia, CKD, low back pain, OA of knees, bilateral inguinal hernia presents to ed c/o near syncope around 7p. pt lasted a few seconds. pt took insulin at noon and had a small meal and since then hasn't eaten anything since. pt felt woozy and tired then almost passed out while at pharmacy. no cp, no headache, no fall, no abd pain, no n/v, no sob, no focal weakness, no incontience. pt asx now.    near syncope r/o arrhythmia vs acs vs lytes imbalance vs hypoglycemia. no sign of seizure/cva/dehydration or drug induced near syncope. labs, ekg, cxr, fluids, re-assess

## 2021-10-13 ENCOUNTER — TRANSCRIPTION ENCOUNTER (OUTPATIENT)
Age: 76
End: 2021-10-13

## 2021-10-13 DIAGNOSIS — I10 ESSENTIAL (PRIMARY) HYPERTENSION: ICD-10-CM

## 2021-10-13 DIAGNOSIS — I82.409 ACUTE EMBOLISM AND THROMBOSIS OF UNSPECIFIED DEEP VEINS OF UNSPECIFIED LOWER EXTREMITY: ICD-10-CM

## 2021-10-13 DIAGNOSIS — R09.89 OTHER SPECIFIED SYMPTOMS AND SIGNS INVOLVING THE CIRCULATORY AND RESPIRATORY SYSTEMS: ICD-10-CM

## 2021-10-13 DIAGNOSIS — Z29.9 ENCOUNTER FOR PROPHYLACTIC MEASURES, UNSPECIFIED: ICD-10-CM

## 2021-10-13 DIAGNOSIS — F41.9 ANXIETY DISORDER, UNSPECIFIED: ICD-10-CM

## 2021-10-13 DIAGNOSIS — E11.9 TYPE 2 DIABETES MELLITUS WITHOUT COMPLICATIONS: ICD-10-CM

## 2021-10-13 DIAGNOSIS — R55 SYNCOPE AND COLLAPSE: ICD-10-CM

## 2021-10-13 DIAGNOSIS — N40.0 BENIGN PROSTATIC HYPERPLASIA WITHOUT LOWER URINARY TRACT SYMPTOMS: ICD-10-CM

## 2021-10-13 DIAGNOSIS — E78.5 HYPERLIPIDEMIA, UNSPECIFIED: ICD-10-CM

## 2021-10-13 DIAGNOSIS — N18.9 CHRONIC KIDNEY DISEASE, UNSPECIFIED: ICD-10-CM

## 2021-10-13 LAB
ALBUMIN SERPL ELPH-MCNC: 3.1 G/DL — LOW (ref 3.5–5)
ALBUMIN SERPL ELPH-MCNC: 3.6 G/DL — SIGNIFICANT CHANGE UP (ref 3.5–5)
ALBUMIN SERPL ELPH-MCNC: SIGNIFICANT CHANGE UP G/DL (ref 3.5–5)
ALP SERPL-CCNC: 85 U/L — SIGNIFICANT CHANGE UP (ref 40–120)
ALP SERPL-CCNC: 87 U/L — SIGNIFICANT CHANGE UP (ref 40–120)
ALP SERPL-CCNC: SIGNIFICANT CHANGE UP U/L (ref 40–120)
ALT FLD-CCNC: 21 U/L DA — SIGNIFICANT CHANGE UP (ref 10–60)
ALT FLD-CCNC: 23 U/L DA — SIGNIFICANT CHANGE UP (ref 10–60)
ALT FLD-CCNC: 24 U/L DA — SIGNIFICANT CHANGE UP (ref 10–60)
ANION GAP SERPL CALC-SCNC: 4 MMOL/L — LOW (ref 5–17)
ANION GAP SERPL CALC-SCNC: 8 MMOL/L — SIGNIFICANT CHANGE UP (ref 5–17)
APPEARANCE UR: CLEAR — SIGNIFICANT CHANGE UP
AST SERPL-CCNC: 17 U/L — SIGNIFICANT CHANGE UP (ref 10–40)
AST SERPL-CCNC: 17 U/L — SIGNIFICANT CHANGE UP (ref 10–40)
AST SERPL-CCNC: 22 U/L — SIGNIFICANT CHANGE UP (ref 10–40)
BACTERIA # UR AUTO: ABNORMAL /HPF
BASOPHILS # BLD AUTO: 0.04 K/UL — SIGNIFICANT CHANGE UP (ref 0–0.2)
BASOPHILS NFR BLD AUTO: 0.5 % — SIGNIFICANT CHANGE UP (ref 0–2)
BILIRUB SERPL-MCNC: 0.5 MG/DL — SIGNIFICANT CHANGE UP (ref 0.2–1.2)
BILIRUB SERPL-MCNC: 0.5 MG/DL — SIGNIFICANT CHANGE UP (ref 0.2–1.2)
BILIRUB SERPL-MCNC: SIGNIFICANT CHANGE UP MG/DL (ref 0.2–1.2)
BILIRUB UR-MCNC: NEGATIVE — SIGNIFICANT CHANGE UP
BUN SERPL-MCNC: 32 MG/DL — HIGH (ref 7–18)
BUN SERPL-MCNC: 38 MG/DL — HIGH (ref 7–18)
BUN SERPL-MCNC: SIGNIFICANT CHANGE UP MG/DL (ref 7–18)
CALCIUM SERPL-MCNC: 9.3 MG/DL — SIGNIFICANT CHANGE UP (ref 8.4–10.5)
CALCIUM SERPL-MCNC: 9.5 MG/DL — SIGNIFICANT CHANGE UP (ref 8.4–10.5)
CALCIUM SERPL-MCNC: SIGNIFICANT CHANGE UP MG/DL (ref 8.4–10.5)
CHLORIDE SERPL-SCNC: 108 MMOL/L — SIGNIFICANT CHANGE UP (ref 96–108)
CHLORIDE SERPL-SCNC: 110 MMOL/L — HIGH (ref 96–108)
CHLORIDE SERPL-SCNC: 110 MMOL/L — HIGH (ref 96–108)
CO2 SERPL-SCNC: 22 MMOL/L — SIGNIFICANT CHANGE UP (ref 22–31)
CO2 SERPL-SCNC: 26 MMOL/L — SIGNIFICANT CHANGE UP (ref 22–31)
CO2 SERPL-SCNC: SIGNIFICANT CHANGE UP MMOL/L (ref 22–31)
COLOR SPEC: YELLOW — SIGNIFICANT CHANGE UP
CREAT SERPL-MCNC: 2.02 MG/DL — HIGH (ref 0.5–1.3)
CREAT SERPL-MCNC: 2.18 MG/DL — HIGH (ref 0.5–1.3)
CREAT SERPL-MCNC: SIGNIFICANT CHANGE UP MG/DL (ref 0.5–1.3)
D DIMER BLD IA.RAPID-MCNC: 608 NG/ML DDU — HIGH
DIFF PNL FLD: ABNORMAL
EOSINOPHIL # BLD AUTO: 0.01 K/UL — SIGNIFICANT CHANGE UP (ref 0–0.5)
EOSINOPHIL NFR BLD AUTO: 0.1 % — SIGNIFICANT CHANGE UP (ref 0–6)
EPI CELLS # UR: SIGNIFICANT CHANGE UP /HPF
ERYTHROCYTE [SEDIMENTATION RATE] IN BLOOD: 17 MM/HR — SIGNIFICANT CHANGE UP (ref 0–20)
GLUCOSE BLDC GLUCOMTR-MCNC: 131 MG/DL — HIGH (ref 70–99)
GLUCOSE BLDC GLUCOMTR-MCNC: 157 MG/DL — HIGH (ref 70–99)
GLUCOSE BLDC GLUCOMTR-MCNC: 195 MG/DL — HIGH (ref 70–99)
GLUCOSE SERPL-MCNC: 220 MG/DL — HIGH (ref 70–99)
GLUCOSE SERPL-MCNC: 265 MG/DL — HIGH (ref 70–99)
GLUCOSE SERPL-MCNC: SIGNIFICANT CHANGE UP MG/DL (ref 70–99)
GLUCOSE UR QL: 250
HCT VFR BLD CALC: 42.4 % — SIGNIFICANT CHANGE UP (ref 39–50)
HCT VFR BLD CALC: 43.1 % — SIGNIFICANT CHANGE UP (ref 39–50)
HGB BLD-MCNC: 13.5 G/DL — SIGNIFICANT CHANGE UP (ref 13–17)
HGB BLD-MCNC: 14 G/DL — SIGNIFICANT CHANGE UP (ref 13–17)
IMM GRANULOCYTES NFR BLD AUTO: 0.5 % — SIGNIFICANT CHANGE UP (ref 0–1.5)
KETONES UR-MCNC: NEGATIVE — SIGNIFICANT CHANGE UP
LEUKOCYTE ESTERASE UR-ACNC: NEGATIVE — SIGNIFICANT CHANGE UP
LYMPHOCYTES # BLD AUTO: 0.62 K/UL — LOW (ref 1–3.3)
LYMPHOCYTES # BLD AUTO: 7.9 % — LOW (ref 13–44)
MCHC RBC-ENTMCNC: 26.7 PG — LOW (ref 27–34)
MCHC RBC-ENTMCNC: 27.1 PG — SIGNIFICANT CHANGE UP (ref 27–34)
MCHC RBC-ENTMCNC: 31.8 GM/DL — LOW (ref 32–36)
MCHC RBC-ENTMCNC: 32.5 GM/DL — SIGNIFICANT CHANGE UP (ref 32–36)
MCV RBC AUTO: 83.5 FL — SIGNIFICANT CHANGE UP (ref 80–100)
MCV RBC AUTO: 84 FL — SIGNIFICANT CHANGE UP (ref 80–100)
MONOCYTES # BLD AUTO: 0.55 K/UL — SIGNIFICANT CHANGE UP (ref 0–0.9)
MONOCYTES NFR BLD AUTO: 7 % — SIGNIFICANT CHANGE UP (ref 2–14)
NEUTROPHILS # BLD AUTO: 6.63 K/UL — SIGNIFICANT CHANGE UP (ref 1.8–7.4)
NEUTROPHILS NFR BLD AUTO: 84 % — HIGH (ref 43–77)
NITRITE UR-MCNC: NEGATIVE — SIGNIFICANT CHANGE UP
NRBC # BLD: 0 /100 WBCS — SIGNIFICANT CHANGE UP (ref 0–0)
NRBC # BLD: 0 /100 WBCS — SIGNIFICANT CHANGE UP (ref 0–0)
NT-PROBNP SERPL-SCNC: 509 PG/ML — HIGH (ref 0–450)
PH UR: 6.5 — SIGNIFICANT CHANGE UP (ref 5–8)
PLATELET # BLD AUTO: 166 K/UL — SIGNIFICANT CHANGE UP (ref 150–400)
PLATELET # BLD AUTO: 167 K/UL — SIGNIFICANT CHANGE UP (ref 150–400)
POTASSIUM SERPL-MCNC: 4.1 MMOL/L — SIGNIFICANT CHANGE UP (ref 3.5–5.3)
POTASSIUM SERPL-MCNC: 4.3 MMOL/L — SIGNIFICANT CHANGE UP (ref 3.5–5.3)
POTASSIUM SERPL-MCNC: 5 MMOL/L — SIGNIFICANT CHANGE UP (ref 3.5–5.3)
POTASSIUM SERPL-SCNC: 4.1 MMOL/L — SIGNIFICANT CHANGE UP (ref 3.5–5.3)
POTASSIUM SERPL-SCNC: 4.3 MMOL/L — SIGNIFICANT CHANGE UP (ref 3.5–5.3)
POTASSIUM SERPL-SCNC: 5 MMOL/L — SIGNIFICANT CHANGE UP (ref 3.5–5.3)
PROT SERPL-MCNC: 7.4 G/DL — SIGNIFICANT CHANGE UP (ref 6–8.3)
PROT SERPL-MCNC: 7.8 G/DL — SIGNIFICANT CHANGE UP (ref 6–8.3)
PROT SERPL-MCNC: SIGNIFICANT CHANGE UP G/DL (ref 6–8.3)
PROT UR-MCNC: 30 MG/DL
RBC # BLD: 5.05 M/UL — SIGNIFICANT CHANGE UP (ref 4.2–5.8)
RBC # BLD: 5.16 M/UL — SIGNIFICANT CHANGE UP (ref 4.2–5.8)
RBC # FLD: 13.4 % — SIGNIFICANT CHANGE UP (ref 10.3–14.5)
RBC # FLD: 13.7 % — SIGNIFICANT CHANGE UP (ref 10.3–14.5)
RBC CASTS # UR COMP ASSIST: SIGNIFICANT CHANGE UP /HPF (ref 0–2)
SARS-COV-2 RNA SPEC QL NAA+PROBE: SIGNIFICANT CHANGE UP
SODIUM SERPL-SCNC: 138 MMOL/L — SIGNIFICANT CHANGE UP (ref 135–145)
SODIUM SERPL-SCNC: 140 MMOL/L — SIGNIFICANT CHANGE UP (ref 135–145)
SODIUM SERPL-SCNC: 141 MMOL/L — SIGNIFICANT CHANGE UP (ref 135–145)
SP GR SPEC: 1.01 — SIGNIFICANT CHANGE UP (ref 1.01–1.02)
TROPONIN I SERPL-MCNC: 0.02 NG/ML — SIGNIFICANT CHANGE UP (ref 0–0.04)
TROPONIN I SERPL-MCNC: <0.015 NG/ML — SIGNIFICANT CHANGE UP (ref 0–0.04)
UROBILINOGEN FLD QL: NEGATIVE — SIGNIFICANT CHANGE UP
WBC # BLD: 7.16 K/UL — SIGNIFICANT CHANGE UP (ref 3.8–10.5)
WBC # BLD: 7.89 K/UL — SIGNIFICANT CHANGE UP (ref 3.8–10.5)
WBC # FLD AUTO: 7.16 K/UL — SIGNIFICANT CHANGE UP (ref 3.8–10.5)
WBC # FLD AUTO: 7.89 K/UL — SIGNIFICANT CHANGE UP (ref 3.8–10.5)
WBC UR QL: SIGNIFICANT CHANGE UP /HPF (ref 0–5)

## 2021-10-13 PROCEDURE — 78579 LUNG VENTILATION IMAGING: CPT | Mod: 26

## 2021-10-13 PROCEDURE — 71045 X-RAY EXAM CHEST 1 VIEW: CPT | Mod: 26

## 2021-10-13 RX ORDER — SODIUM CHLORIDE 9 MG/ML
1000 INJECTION, SOLUTION INTRAVENOUS
Refills: 0 | Status: DISCONTINUED | OUTPATIENT
Start: 2021-10-13 | End: 2021-10-15

## 2021-10-13 RX ORDER — SIMVASTATIN 20 MG/1
40 TABLET, FILM COATED ORAL AT BEDTIME
Refills: 0 | Status: DISCONTINUED | OUTPATIENT
Start: 2021-10-13 | End: 2021-10-15

## 2021-10-13 RX ORDER — DEXTROSE 50 % IN WATER 50 %
15 SYRINGE (ML) INTRAVENOUS ONCE
Refills: 0 | Status: DISCONTINUED | OUTPATIENT
Start: 2021-10-13 | End: 2021-10-15

## 2021-10-13 RX ORDER — FLUTICASONE PROPIONATE 50 MCG
1 SPRAY, SUSPENSION NASAL
Qty: 0 | Refills: 0 | DISCHARGE

## 2021-10-13 RX ORDER — TAMSULOSIN HYDROCHLORIDE 0.4 MG/1
0.4 CAPSULE ORAL AT BEDTIME
Refills: 0 | Status: DISCONTINUED | OUTPATIENT
Start: 2021-10-13 | End: 2021-10-15

## 2021-10-13 RX ORDER — LACTULOSE 10 G/15ML
20 SOLUTION ORAL DAILY
Refills: 0 | Status: DISCONTINUED | OUTPATIENT
Start: 2021-10-13 | End: 2021-10-15

## 2021-10-13 RX ORDER — LACTULOSE 10 G/15ML
30 SOLUTION ORAL
Qty: 0 | Refills: 0 | DISCHARGE

## 2021-10-13 RX ORDER — INSULIN DETEMIR 100/ML (3)
30 INSULIN PEN (ML) SUBCUTANEOUS
Qty: 0 | Refills: 0 | DISCHARGE

## 2021-10-13 RX ORDER — DOCUSATE SODIUM 100 MG
2 CAPSULE ORAL
Qty: 0 | Refills: 0 | DISCHARGE

## 2021-10-13 RX ORDER — TAMSULOSIN HYDROCHLORIDE 0.4 MG/1
1 CAPSULE ORAL
Qty: 0 | Refills: 0 | DISCHARGE

## 2021-10-13 RX ORDER — DEXTROSE 50 % IN WATER 50 %
25 SYRINGE (ML) INTRAVENOUS ONCE
Refills: 0 | Status: DISCONTINUED | OUTPATIENT
Start: 2021-10-13 | End: 2021-10-15

## 2021-10-13 RX ORDER — ACETAMINOPHEN 500 MG
2 TABLET ORAL
Qty: 0 | Refills: 0 | DISCHARGE

## 2021-10-13 RX ORDER — INSULIN GLARGINE 100 [IU]/ML
30 INJECTION, SOLUTION SUBCUTANEOUS EVERY MORNING
Refills: 0 | Status: DISCONTINUED | OUTPATIENT
Start: 2021-10-13 | End: 2021-10-15

## 2021-10-13 RX ORDER — INSULIN DETEMIR 100/ML (3)
60 INSULIN PEN (ML) SUBCUTANEOUS
Qty: 0 | Refills: 0 | DISCHARGE

## 2021-10-13 RX ORDER — ENOXAPARIN SODIUM 100 MG/ML
60 INJECTION SUBCUTANEOUS ONCE
Refills: 0 | Status: COMPLETED | OUTPATIENT
Start: 2021-10-13 | End: 2021-10-13

## 2021-10-13 RX ORDER — INSULIN LISPRO 100/ML
VIAL (ML) SUBCUTANEOUS
Refills: 0 | Status: DISCONTINUED | OUTPATIENT
Start: 2021-10-13 | End: 2021-10-15

## 2021-10-13 RX ORDER — SIMVASTATIN 20 MG/1
1 TABLET, FILM COATED ORAL
Qty: 0 | Refills: 0 | DISCHARGE

## 2021-10-13 RX ORDER — ACETAMINOPHEN 500 MG
650 TABLET ORAL EVERY 4 HOURS
Refills: 0 | Status: DISCONTINUED | OUTPATIENT
Start: 2021-10-13 | End: 2021-10-15

## 2021-10-13 RX ORDER — GLUCAGON INJECTION, SOLUTION 0.5 MG/.1ML
1 INJECTION, SOLUTION SUBCUTANEOUS ONCE
Refills: 0 | Status: DISCONTINUED | OUTPATIENT
Start: 2021-10-13 | End: 2021-10-15

## 2021-10-13 RX ORDER — SENNA PLUS 8.6 MG/1
2 TABLET ORAL AT BEDTIME
Refills: 0 | Status: DISCONTINUED | OUTPATIENT
Start: 2021-10-13 | End: 2021-10-15

## 2021-10-13 RX ORDER — CHOLECALCIFEROL (VITAMIN D3) 125 MCG
1 CAPSULE ORAL
Qty: 0 | Refills: 0 | DISCHARGE

## 2021-10-13 RX ORDER — DOXAZOSIN MESYLATE 4 MG
2 TABLET ORAL AT BEDTIME
Refills: 0 | Status: DISCONTINUED | OUTPATIENT
Start: 2021-10-13 | End: 2021-10-15

## 2021-10-13 RX ORDER — LIDOCAINE 4 G/100G
1 CREAM TOPICAL
Qty: 0 | Refills: 0 | DISCHARGE

## 2021-10-13 RX ORDER — DOCUSATE SODIUM 100 MG
1 CAPSULE ORAL
Qty: 0 | Refills: 0 | DISCHARGE

## 2021-10-13 RX ORDER — APIXABAN 2.5 MG/1
1 TABLET, FILM COATED ORAL
Qty: 0 | Refills: 0 | DISCHARGE

## 2021-10-13 RX ORDER — DEXTROSE 50 % IN WATER 50 %
12.5 SYRINGE (ML) INTRAVENOUS ONCE
Refills: 0 | Status: DISCONTINUED | OUTPATIENT
Start: 2021-10-13 | End: 2021-10-15

## 2021-10-13 RX ORDER — HYDRALAZINE HCL 50 MG
50 TABLET ORAL
Refills: 0 | Status: DISCONTINUED | OUTPATIENT
Start: 2021-10-13 | End: 2021-10-15

## 2021-10-13 RX ORDER — CHOLECALCIFEROL (VITAMIN D3) 125 MCG
1000 CAPSULE ORAL DAILY
Refills: 0 | Status: DISCONTINUED | OUTPATIENT
Start: 2021-10-13 | End: 2021-10-15

## 2021-10-13 RX ORDER — LIDOCAINE 4 G/100G
1 CREAM TOPICAL DAILY
Refills: 0 | Status: DISCONTINUED | OUTPATIENT
Start: 2021-10-13 | End: 2021-10-15

## 2021-10-13 RX ORDER — DOXAZOSIN MESYLATE 4 MG
1 TABLET ORAL
Qty: 0 | Refills: 0 | DISCHARGE

## 2021-10-13 RX ORDER — FLUTICASONE PROPIONATE 50 MCG
1 SPRAY, SUSPENSION NASAL
Refills: 0 | Status: DISCONTINUED | OUTPATIENT
Start: 2021-10-13 | End: 2021-10-15

## 2021-10-13 RX ORDER — FINASTERIDE 5 MG/1
1 TABLET, FILM COATED ORAL
Qty: 0 | Refills: 0 | DISCHARGE

## 2021-10-13 RX ORDER — HYDRALAZINE HCL 50 MG
1 TABLET ORAL
Qty: 0 | Refills: 0 | DISCHARGE

## 2021-10-13 RX ORDER — AMLODIPINE BESYLATE 2.5 MG/1
1 TABLET ORAL
Qty: 0 | Refills: 0 | DISCHARGE

## 2021-10-13 RX ORDER — FINASTERIDE 5 MG/1
5 TABLET, FILM COATED ORAL DAILY
Refills: 0 | Status: DISCONTINUED | OUTPATIENT
Start: 2021-10-13 | End: 2021-10-15

## 2021-10-13 RX ORDER — AMLODIPINE BESYLATE 2.5 MG/1
10 TABLET ORAL DAILY
Refills: 0 | Status: DISCONTINUED | OUTPATIENT
Start: 2021-10-13 | End: 2021-10-15

## 2021-10-13 RX ORDER — KETOCONAZOLE 20 MG/G
1 AEROSOL, FOAM TOPICAL
Qty: 0 | Refills: 0 | DISCHARGE

## 2021-10-13 RX ADMIN — ENOXAPARIN SODIUM 60 MILLIGRAM(S): 100 INJECTION SUBCUTANEOUS at 03:11

## 2021-10-13 RX ADMIN — SENNA PLUS 2 TABLET(S): 8.6 TABLET ORAL at 21:17

## 2021-10-13 RX ADMIN — FINASTERIDE 5 MILLIGRAM(S): 5 TABLET, FILM COATED ORAL at 11:13

## 2021-10-13 RX ADMIN — Medication 50 MILLIGRAM(S): at 17:58

## 2021-10-13 RX ADMIN — INSULIN GLARGINE 30 UNIT(S): 100 INJECTION, SOLUTION SUBCUTANEOUS at 12:14

## 2021-10-13 RX ADMIN — Medication 2 MILLIGRAM(S): at 21:17

## 2021-10-13 RX ADMIN — LIDOCAINE 1 PATCH: 4 CREAM TOPICAL at 11:13

## 2021-10-13 RX ADMIN — SIMVASTATIN 40 MILLIGRAM(S): 20 TABLET, FILM COATED ORAL at 21:17

## 2021-10-13 RX ADMIN — LIDOCAINE 1 PATCH: 4 CREAM TOPICAL at 19:00

## 2021-10-13 RX ADMIN — Medication 1: at 12:16

## 2021-10-13 RX ADMIN — TAMSULOSIN HYDROCHLORIDE 0.4 MILLIGRAM(S): 0.4 CAPSULE ORAL at 21:17

## 2021-10-13 RX ADMIN — Medication 1000 UNIT(S): at 11:12

## 2021-10-13 RX ADMIN — Medication 2: at 07:51

## 2021-10-13 RX ADMIN — LIDOCAINE 1 PATCH: 4 CREAM TOPICAL at 23:57

## 2021-10-13 RX ADMIN — Medication 1: at 17:58

## 2021-10-13 NOTE — H&P ADULT - ASSESSMENT
Patient is a 76yoM, AAOx3 and ambulates independently at baseline, w/ PMH of HTN, DM, HLD, CKD, BPH, and anxiety, presents after one syncopal episode

## 2021-10-13 NOTE — ED ADULT NURSE NOTE - OBJECTIVE STATEMENT
BIBA for syncopal episode, presently a&ox3, ambulates steadily, denies any pain or any complaints, states maybe it was because his last meal was at noon, pt is diabetic, ems report BG=160, pt is a [facility] resident and was at a local store near residence when he syncopized, pt had his covid booster vaccine today

## 2021-10-13 NOTE — H&P ADULT - PROBLEM SELECTOR PLAN 1
- p/a 1 syncopal episode  - ddx: PE vs CHF vs UTI vs COVID vaccine vs orthostatic  - EKG sinus tachy, RBBB, T wave abnormalities  - trop 0.015  -   - s/p lovenox 60mg x1  - telemonitoring   - f/u echo, V/Q scan - p/a 1 syncopal episode  - ddx: PE vs CHF vs UTI vs COVID vaccine vs orthostatic  - EKG sinus tachy, RBBB, T wave abnormalities  - WBC 12.86  - trop 0.015  -   - s/p lovenox 60mg x1  - telemonitoring   - f/u echo, V/Q scan, U/A, Ucx, Bcx

## 2021-10-13 NOTE — ED ADULT NURSE NOTE - ED STAT RN HANDOFF DETAILS
Patient stable hemodynamically, telemetry in progress, transferred to [facility] endorsed to Adrian FALL

## 2021-10-13 NOTE — DISCHARGE NOTE PROVIDER - HOSPITAL COURSE
Patient is a 76yoM, AAOx3 and ambulates independently at baseline, w/ PMH of HTN, DM, HLD, CKD, BPH, and anxiety, presents after one syncopal episode. In ED EKG sinus tachy, RBBB, T wave abnormalities  WBC 12.86, trop 0.015 & . Was given a stat dose of Lovenox 60mg, Sq. Was admitted for syncopal workup.        >>>>>>>>>>>>>>>>>>>>>>>>>>>>>>>>>>>>>>>>>>>>>INCOMPLETE>>>>>>>>>>>>>>>>>>>>>>>>>>>>>>>>>>>>>>>>>>> Patient is a 76yoM, AAOx3 and ambulates independently at baseline, w/ PMH of HTN, DM, HLD, CKD, BPH, and anxiety, presents after one syncopal episode. In ED EKG sinus tachy, RBBB, T wave abnormalities  WBC 12.86, trop 0.015 & . Was given a stat dose of Lovenox 60mg, Sq.  Patient admitted under medicine for syncopal workup. CE enzyme negative. V/Q scan negative for PE. Echo done reading pending. Orthostatic negative.   resumed Eliquis for DVT . Cleared by cardiologist Dr. Phelan .   Clinically improving, optimized to discharge with outpatient follow up with PCP within 1-2 weeks. Please note this is a brief hospital course, for detailed information please refer to daily progress and consultant notes. Patient is a 76yoM, AAOx3 and ambulates independently at baseline, w/ PMH of HTN, DM, HLD, CKD, BPH, and anxiety, presents after one syncopal episode. In ED EKG sinus tachy, RBBB, T wave abnormalities  WBC 12.86, trop 0.015 & . Was given a stat dose of Lovenox 60mg, Sq.  Patient admitted under medicine for syncopal workup. CE enzyme negative. V/Q scan negative for PE. Echo done reading pending. Orthostatic negative.   resumed Eliquis for DVT . Cleared by cardiologist Dr. Phelan . Urine culture and blood culture negative UTD   Clinically improving, optimized to discharge with outpatient follow up with PCP within 1-2 weeks. Please note this is a brief hospital course, for detailed information please refer to daily progress and consultant notes.

## 2021-10-13 NOTE — ED ADULT NURSE NOTE - NSIMPLEMENTINTERV_GEN_ALL_ED
No Implemented All Universal Safety Interventions:  [place] to call system. Call bell, personal items and telephone within reach. Instruct patient to call for assistance. Room bathroom lighting operational. Non-slip footwear when patient is off stretcher. Physically safe environment: no spills, clutter or unnecessary equipment. Stretcher in lowest position, wheels locked, appropriate side rails in place.

## 2021-10-13 NOTE — H&P ADULT - HISTORY OF PRESENT ILLNESS
Patient is a 76yoM, AAOx3 and ambulates independently at baseline, w/ PMH of HTN, DM, HLD, CKD, BPH, and anxiety, presents after one syncopal episode. He states he syncopized for 2-3 seconds on sitting position. He denies any presyncopal or postictal states. He reports he received thrid dose of COVID vaccine this morning. He was feeling fine, but he started to feel dizzy and weak around at 5pm. He also reports that he was diagnosed with DVT and was given AC for only 1-2wks. He endorses mild headache. He denies fever, chills, n/v, chest pain, SOB, abdominal pain, urinary or bowel movement changes. Patient is a 76yoM, AAOx3 and ambulates independently at baseline, w/ PMH of HTN, DM, HLD, CKD, BPH, and anxiety, presents after one syncopal episode. He states he syncopized for 2-3 seconds on sitting position. He denies any presyncopal or postictal states. He reports he received thrid dose of COVID vaccine this morning. He was feeling fine, but he started to feel dizzy and weak around at 5pm. He also reports that he was diagnosed with DVT and is on AC. He endorses mild headache. He denies fever, chills, n/v, chest pain, SOB, abdominal pain, urinary or bowel movement changes.

## 2021-10-13 NOTE — DISCHARGE NOTE PROVIDER - NSDCCPCAREPLAN_GEN_ALL_CORE_FT
PRINCIPAL DISCHARGE DIAGNOSIS  Diagnosis: Near syncope  Assessment and Plan of Treatment: You were admitted to syncopy likely due to low sugar Vs dehydration vs covid vaccine . You had a V/Q scan which was negative for pulmonary embelism.  Your orthostatic hypertension was negative. You were seen by a cardiologist and had a an echo. It was reviwed by Dr. Phelan. Please follow up your PCP within 1 weekw of discharge. Please seek medical help if you have the following symptoms, dizziness, chest pain. shortness of breath, bleeding. and or double vision.      SECONDARY DISCHARGE DIAGNOSES  Diagnosis: Deep vein thrombosis (DVT)  Assessment and Plan of Treatment: Continue your home dose eliquis    Diagnosis: Hypertension  Assessment and Plan of Treatment:     Diagnosis: Stage 4 chronic kidney disease  Assessment and Plan of Treatment: You were noted to have an increase in your Cr/Bun likely due to dehydration. It has resolved to baseline.

## 2021-10-13 NOTE — DISCHARGE NOTE PROVIDER - CARE PROVIDER_API CALL
Usman Carballo  INTERNAL MEDICINE  [id number] [place] [address]  Phone: [phone]  Fax: [phone]  Follow Up Time: 1-3 days

## 2021-10-13 NOTE — H&P ADULT - PROBLEM SELECTOR PLAN 2
- h/o HTN, on   - c/w - h/o recent DVT, on eliquis 5mg bid  - s/p FD lovenox in ED  - hold eliquis for now

## 2021-10-13 NOTE — DISCHARGE NOTE PROVIDER - NSDCMRMEDTOKEN_GEN_ALL_CORE_FT
Cardura 2 mg oral tablet: 1 tab(s) orally once a day (in the evening)  Colace 100 mg oral capsule: 2 cap(s) orally once a day (at bedtime)  Eliquis 5 mg oral tablet: 1 tab(s) orally 2 times a day  finasteride 5 mg oral tablet: 1 tab(s) orally once a day  Flomax 0.4 mg oral capsule: 1 cap(s) orally once a day  Flonase 50 mcg/inh nasal spray: 1 spray(s) nasal 2 times a day, As Needed  glipiZIDE 10 mg oral tablet: 1 tab(s) orally 2 times a day  hydrALAZINE 50 mg oral tablet: 1 tab(s) orally 2 times a day  ketoconazole 2% topical cream: Apply topically to affected area 2 times a day  lactulose 10 g/15 mL oral syrup: 30 milliliter(s) orally once a day, As Needed for constipation  Levemir 100 units/mL subcutaneous solution: 60 unit(s) subcutaneous once a day (in the morning)  lidocaine 4% patch: Apply topically to affected area once a day  Nizoral 2% topical shampoo: Apply topically to affected area once  Norvasc 10 mg oral tablet: 1 tab(s) orally once a day  Tylenol 325 mg oral tablet: 2 tab(s) orally every 4 hours, As Needed  Vitamin D3 1000 intl units (25 mcg) oral tablet: 1 tab(s) orally once a day  Zocor 40 mg oral tablet: 1 tab(s) orally once a day (at bedtime)   Cardura 2 mg oral tablet: 1 tab(s) orally once a day (in the evening)  Colace 100 mg oral capsule: 2 cap(s) orally once a day (at bedtime)  Eliquis 5 mg oral tablet: 1 tab(s) orally 2 times a day  finasteride 5 mg oral tablet: 1 tab(s) orally once a day  Flomax 0.4 mg oral capsule: 1 cap(s) orally once a day  Flonase 50 mcg/inh nasal spray: 1 spray(s) nasal 2 times a day, As Needed  glipiZIDE 10 mg oral tablet: 1 tab(s) orally 2 times a day  hydrALAZINE 50 mg oral tablet: 1 tab(s) orally 2 times a day  ketoconazole 2% topical cream: Apply topically to affected area 2 times a day  lactulose 10 g/15 mL oral syrup: 30 milliliter(s) orally once a day, As Needed for constipation  Levemir 100 units/mL subcutaneous solution: 60 unit(s) subcutaneous once a day (in the morning)  lidocaine 4% patch: Apply topically to affected area once a day  Norvasc 10 mg oral tablet: 1 tab(s) orally once a day  Tylenol 325 mg oral tablet: 2 tab(s) orally every 4 hours, As Needed  Vitamin D3 1000 intl units (25 mcg) oral tablet: 1 tab(s) orally once a day  Zocor 40 mg oral tablet: 1 tab(s) orally once a day (at bedtime)   Cardura 2 mg oral tablet: 1 tab(s) orally once a day (in the evening)  cefuroxime 250 mg oral tablet: 1 tab(s) orally 2 times a day   Colace 100 mg oral capsule: 2 cap(s) orally once a day (at bedtime)  Eliquis 5 mg oral tablet: 1 tab(s) orally 2 times a day  finasteride 5 mg oral tablet: 1 tab(s) orally once a day  Flomax 0.4 mg oral capsule: 1 cap(s) orally once a day  Flonase 50 mcg/inh nasal spray: 1 spray(s) nasal 2 times a day, As Needed  glipiZIDE 10 mg oral tablet: 1 tab(s) orally 2 times a day  hydrALAZINE 50 mg oral tablet: 1 tab(s) orally 2 times a day  ketoconazole 2% topical cream: Apply topically to affected area 2 times a day  lactulose 10 g/15 mL oral syrup: 30 milliliter(s) orally once a day, As Needed for constipation  Levemir 100 units/mL subcutaneous solution: 60 unit(s) subcutaneous once a day (in the morning)  lidocaine 4% patch: Apply topically to affected area once a day  Norvasc 10 mg oral tablet: 1 tab(s) orally once a day  Tylenol 325 mg oral tablet: 2 tab(s) orally every 4 hours, As Needed  Vitamin D3 1000 intl units (25 mcg) oral tablet: 1 tab(s) orally once a day  Zocor 40 mg oral tablet: 1 tab(s) orally once a day (at bedtime)

## 2021-10-13 NOTE — ED ADULT NURSE NOTE - CHIEF COMPLAINT QUOTE
BIBA for syncopal episode, presently a&ox3, ambulates steadily, denies any pain or any complaints, states maybe it was because his last meal was at noon, pt is diabetic, ems report BG=160, pt is a [facility] resident and was at a local store near residence when he syncopized, pt had his covid booster vaccine today

## 2021-10-13 NOTE — H&P ADULT - PROBLEM SELECTOR PLAN 4
- h/o HLD, on - h/o DM, on lantus 60u qAM, glipizide, and ISS  - c/w lantus 30u qAM and ISS  - FS qACHs  - f/u a1c

## 2021-10-14 ENCOUNTER — TRANSCRIPTION ENCOUNTER (OUTPATIENT)
Age: 76
End: 2021-10-14

## 2021-10-14 DIAGNOSIS — Z02.9 ENCOUNTER FOR ADMINISTRATIVE EXAMINATIONS, UNSPECIFIED: ICD-10-CM

## 2021-10-14 LAB
A1C WITH ESTIMATED AVERAGE GLUCOSE RESULT: 9.8 % — HIGH (ref 4–5.6)
ANION GAP SERPL CALC-SCNC: 7 MMOL/L — SIGNIFICANT CHANGE UP (ref 5–17)
BUN SERPL-MCNC: 31 MG/DL — HIGH (ref 7–18)
CALCIUM SERPL-MCNC: 9 MG/DL — SIGNIFICANT CHANGE UP (ref 8.4–10.5)
CHLORIDE SERPL-SCNC: 110 MMOL/L — HIGH (ref 96–108)
CO2 SERPL-SCNC: 25 MMOL/L — SIGNIFICANT CHANGE UP (ref 22–31)
COVID-19 SPIKE DOMAIN AB INTERP: POSITIVE
COVID-19 SPIKE DOMAIN ANTIBODY RESULT: >250 U/ML — HIGH
CREAT SERPL-MCNC: 1.94 MG/DL — HIGH (ref 0.5–1.3)
ESTIMATED AVERAGE GLUCOSE: 235 MG/DL — HIGH (ref 68–114)
GLUCOSE BLDC GLUCOMTR-MCNC: 134 MG/DL — HIGH (ref 70–99)
GLUCOSE BLDC GLUCOMTR-MCNC: 153 MG/DL — HIGH (ref 70–99)
GLUCOSE BLDC GLUCOMTR-MCNC: 172 MG/DL — HIGH (ref 70–99)
GLUCOSE BLDC GLUCOMTR-MCNC: 199 MG/DL — HIGH (ref 70–99)
GLUCOSE SERPL-MCNC: 172 MG/DL — HIGH (ref 70–99)
HCT VFR BLD CALC: 42 % — SIGNIFICANT CHANGE UP (ref 39–50)
HCV AB S/CO SERPL IA: 0.17 S/CO — SIGNIFICANT CHANGE UP (ref 0–0.99)
HCV AB SERPL-IMP: SIGNIFICANT CHANGE UP
HGB BLD-MCNC: 13.2 G/DL — SIGNIFICANT CHANGE UP (ref 13–17)
MCHC RBC-ENTMCNC: 26.7 PG — LOW (ref 27–34)
MCHC RBC-ENTMCNC: 31.4 GM/DL — LOW (ref 32–36)
MCV RBC AUTO: 84.8 FL — SIGNIFICANT CHANGE UP (ref 80–100)
MRSA PCR RESULT.: DETECTED
NRBC # BLD: 0 /100 WBCS — SIGNIFICANT CHANGE UP (ref 0–0)
PLATELET # BLD AUTO: 164 K/UL — SIGNIFICANT CHANGE UP (ref 150–400)
POTASSIUM SERPL-MCNC: 4.3 MMOL/L — SIGNIFICANT CHANGE UP (ref 3.5–5.3)
POTASSIUM SERPL-SCNC: 4.3 MMOL/L — SIGNIFICANT CHANGE UP (ref 3.5–5.3)
RBC # BLD: 4.95 M/UL — SIGNIFICANT CHANGE UP (ref 4.2–5.8)
RBC # FLD: 13.7 % — SIGNIFICANT CHANGE UP (ref 10.3–14.5)
S AUREUS DNA NOSE QL NAA+PROBE: DETECTED
SARS-COV-2 IGG+IGM SERPL QL IA: >250 U/ML — HIGH
SARS-COV-2 IGG+IGM SERPL QL IA: POSITIVE
SODIUM SERPL-SCNC: 142 MMOL/L — SIGNIFICANT CHANGE UP (ref 135–145)
WBC # BLD: 5.13 K/UL — SIGNIFICANT CHANGE UP (ref 3.8–10.5)
WBC # FLD AUTO: 5.13 K/UL — SIGNIFICANT CHANGE UP (ref 3.8–10.5)

## 2021-10-14 RX ORDER — KETOCONAZOLE 20 MG/G
1 AEROSOL, FOAM TOPICAL
Qty: 0 | Refills: 0 | DISCHARGE

## 2021-10-14 RX ORDER — APIXABAN 2.5 MG/1
5 TABLET, FILM COATED ORAL EVERY 12 HOURS
Refills: 0 | Status: DISCONTINUED | OUTPATIENT
Start: 2021-10-14 | End: 2021-10-15

## 2021-10-14 RX ORDER — CEFUROXIME AXETIL 250 MG
250 TABLET ORAL EVERY 12 HOURS
Refills: 0 | Status: DISCONTINUED | OUTPATIENT
Start: 2021-10-14 | End: 2021-10-15

## 2021-10-14 RX ORDER — CEFUROXIME AXETIL 250 MG
1 TABLET ORAL
Qty: 10 | Refills: 0
Start: 2021-10-14 | End: 2021-10-18

## 2021-10-14 RX ADMIN — Medication 1: at 12:03

## 2021-10-14 RX ADMIN — APIXABAN 5 MILLIGRAM(S): 2.5 TABLET, FILM COATED ORAL at 13:30

## 2021-10-14 RX ADMIN — TAMSULOSIN HYDROCHLORIDE 0.4 MILLIGRAM(S): 0.4 CAPSULE ORAL at 22:12

## 2021-10-14 RX ADMIN — LIDOCAINE 1 PATCH: 4 CREAM TOPICAL at 12:07

## 2021-10-14 RX ADMIN — Medication 50 MILLIGRAM(S): at 17:22

## 2021-10-14 RX ADMIN — Medication 1000 UNIT(S): at 12:03

## 2021-10-14 RX ADMIN — AMLODIPINE BESYLATE 10 MILLIGRAM(S): 2.5 TABLET ORAL at 05:31

## 2021-10-14 RX ADMIN — INSULIN GLARGINE 30 UNIT(S): 100 INJECTION, SOLUTION SUBCUTANEOUS at 08:00

## 2021-10-14 RX ADMIN — Medication 2 MILLIGRAM(S): at 22:12

## 2021-10-14 RX ADMIN — SIMVASTATIN 40 MILLIGRAM(S): 20 TABLET, FILM COATED ORAL at 22:12

## 2021-10-14 RX ADMIN — Medication 50 MILLIGRAM(S): at 05:31

## 2021-10-14 RX ADMIN — Medication 1: at 22:11

## 2021-10-14 RX ADMIN — APIXABAN 5 MILLIGRAM(S): 2.5 TABLET, FILM COATED ORAL at 17:22

## 2021-10-14 RX ADMIN — Medication 1: at 08:00

## 2021-10-14 RX ADMIN — FINASTERIDE 5 MILLIGRAM(S): 5 TABLET, FILM COATED ORAL at 12:03

## 2021-10-14 RX ADMIN — Medication 250 MILLIGRAM(S): at 17:22

## 2021-10-14 NOTE — PROGRESS NOTE ADULT - PROBLEM SELECTOR PLAN 1
Syn copy and collapse likely due to  PE vs CHF vs UTI vs COVID vaccine vs orthostatic  - EKG sinus tachy, RBBB, T wave abnormalities  - WBC 12.86 but improved  - trop 0.015  -   - resumed Eliquis   - echo- EF >55%, grade 1 DHF   -V/Q scan- negative for PE  -U/A - negative   -Ucx, Bcx pending

## 2021-10-14 NOTE — PROGRESS NOTE ADULT - PROBLEM SELECTOR PLAN 2
At home on oral agent and Insulin   -will switch to lantus and sliding scale premeal while in patient  -A1c 9.8

## 2021-10-14 NOTE — PROGRESS NOTE ADULT - ASSESSMENT
Patient is a 76yoM, AAOx3 and ambulates independently at baseline, w/ PMH of HTN, DM, HLD, CKD, BPH, and anxiety, presents after one syncopal episode. In ED EKG sinus tachy, RBBB, T wave abnormalities  WBC 12.86, trop 0.015 & . Was given a stat dose of Lovenox 60mg, Sq.  Patient admitted under medicine for syncopal workup. CE enzyme negative. V/Q scan negative for PE. Echo result EF >55%, with  grade 1 HF. Orthostatic negative.   resumed Eliquis for DVT . Cleared by cardiologist Dr. Phelan .  Pending UC and BC, although, UA is negative Dr. Phelan requested to treat patient for UTI with Ceftin 250mg BID x 5 days

## 2021-10-14 NOTE — DISCHARGE NOTE NURSING/CASE MANAGEMENT/SOCIAL WORK - PATIENT PORTAL LINK FT
You can access the FollowMyHealth Patient Portal offered by [facility] by registering at the following website: [email or web address]. By joining [employer]’s FollowMyHealth portal, you will also be able to view your health information using other applications (apps) compatible with our system.

## 2021-10-14 NOTE — PROGRESS NOTE ADULT - SUBJECTIVE AND OBJECTIVE BOX
Patient is a 76y old  Male who presents with a chief complaint of syncope (13 Oct 2021 23:22)      INTERVAL HPI/OVERNIGHT EVENTS: no events overnight. Resting in bed eating breakfast        REVIEW OF SYSTEMS:  CONSTITUTIONAL: No fever, chills  ENMT:  No difficulty hearing, no change in vision  NECK: No pain or stiffness  RESPIRATORY: No cough, SOB  CARDIOVASCULAR: No chest pain, palpitations  GASTROINTESTINAL: No abdominal pain. No nausea, vomiting, or diarrhea  GENITOURINARY: No dysuria  NEUROLOGICAL: No HA  SKIN: No itching, burning, rashes, or lesions   MUSCULOSKELETAL: No joint pain or swelling; No muscle, back, or extremity pain      T(C): 36.6 (10-14-21 @ 14:14), Max: 37.3 (10-13-21 @ 20:00)  HR: 85 (10-14-21 @ 14:14) (84 - 101)  BP: 134/83 (10-14-21 @ 14:14) (124/81 - 145/67)  RR: 20 (10-14-21 @ 14:14) (18 - 20)  SpO2: 98% (10-14-21 @ 14:14) (96% - 99%)  Wt(kg): --Vital Signs Last 24 Hrs  T(C): 36.6 (14 Oct 2021 14:14), Max: 37.3 (13 Oct 2021 20:00)  T(F): 97.8 (14 Oct 2021 14:14), Max: 99.1 (13 Oct 2021 20:00)  HR: 85 (14 Oct 2021 14:14) (84 - 101)  BP: 134/83 (14 Oct 2021 14:14) (124/81 - 145/67)  BP(mean): 93 (13 Oct 2021 20:00) (93 - 94)  RR: 20 (14 Oct 2021 14:14) (18 - 20)  SpO2: 98% (14 Oct 2021 14:14) (96% - 99%)    PHYSICAL EXAM:  GENERAL: NAD  EYES: clear conjunctiva  ENMT: Moist mucous membranes  NECK: Supple, No JVD,   CHEST/LUNG: Clear to auscultation bilaterally; No rales, rhonchi, wheezing, or rubs  HEART: S1, S2, Regular rate and rhythm  ABDOMEN: Soft, Nontender, Nondistended; Bowel sounds present  NEURO: Alert & Oriented X3  EXTREMITIES: No LE edema, no calf tenderness  SKIN: No rashes or lesions    MEDICATIONS  (STANDING):  amLODIPine   Tablet 10 milliGRAM(s) Oral daily  apixaban 5 milliGRAM(s) Oral every 12 hours  cefuroxime   Tablet 250 milliGRAM(s) Oral every 12 hours  cholecalciferol 1000 Unit(s) Oral daily  dextrose 40% Gel 15 Gram(s) Oral once  dextrose 5%. 1000 milliLiter(s) (50 mL/Hr) IV Continuous <Continuous>  dextrose 5%. 1000 milliLiter(s) (100 mL/Hr) IV Continuous <Continuous>  dextrose 50% Injectable 25 Gram(s) IV Push once  dextrose 50% Injectable 12.5 Gram(s) IV Push once  dextrose 50% Injectable 25 Gram(s) IV Push once  doxazosin 2 milliGRAM(s) Oral at bedtime  finasteride 5 milliGRAM(s) Oral daily  glucagon  Injectable 1 milliGRAM(s) IntraMuscular once  hydrALAZINE 50 milliGRAM(s) Oral two times a day  insulin glargine Injectable (LANTUS) 30 Unit(s) SubCutaneous every morning  insulin lispro (ADMELOG) corrective regimen sliding scale   SubCutaneous Before meals and at bedtime  lidocaine   4% Patch 1 Patch Transdermal daily  senna 2 Tablet(s) Oral at bedtime  simvastatin 40 milliGRAM(s) Oral at bedtime  tamsulosin 0.4 milliGRAM(s) Oral at bedtime    MEDICATIONS  (PRN):  acetaminophen   Tablet .. 650 milliGRAM(s) Oral every 4 hours PRN Temp greater or equal to 38C (100.4F), Mild Pain (1 - 3)  fluticasone propionate 50 MICROgram(s)/spray Nasal Spray 1 Spray(s) Both Nostrils two times a day PRN nasal congestion  lactulose Syrup 20 Gram(s) Oral daily PRN constipation      Consultant(s) Notes Reviewed:  [x ] YES  [ ] NO  Care Discussed with Consultants/Other Providers [ x] YES  [ ] NO    LABS:                        13.2   5.13  )-----------( 164      ( 14 Oct 2021 11:46 )             42.0     10-14    142  |  110<H>  |  31<H>  ----------------------------<  172<H>  4.3   |  25  |  1.94<H>    Ca    9.0      14 Oct 2021 11:46    TPro  7.4  /  Alb  3.1<L>  /  TBili  0.5  /  DBili  x   /  AST  17  /  ALT  21  /  AlkPhos  87  10-13      CAPILLARY BLOOD GLUCOSE      POCT Blood Glucose.: 153 mg/dL (14 Oct 2021 11:44)  POCT Blood Glucose.: 172 mg/dL (14 Oct 2021 07:49)  POCT Blood Glucose.: 131 mg/dL (13 Oct 2021 22:43)  POCT Blood Glucose.: 157 mg/dL (13 Oct 2021 17:26)        Urinalysis Basic - ( 13 Oct 2021 11:01 )    Color: Yellow / Appearance: Clear / S.015 / pH: x  Gluc: x / Ketone: Negative  / Bili: Negative / Urobili: Negative   Blood: x / Protein: 30 mg/dL / Nitrite: Negative   Leuk Esterase: Negative / RBC: 0-2 /HPF / WBC 3-5 /HPF   Sq Epi: x / Non Sq Epi: Few /HPF / Bacteria: Trace /HPF        RADIOLOGY & ADDITIONAL TESTS:    < from: Xray Chest 1 View- PORTABLE-Routine (Xray Chest 1 View- PORTABLE-Routine .) (10.13.21 @ 12:52) >    EXAM:  XR CHEST PORTABLE ROUTINE 1V                            PROCEDURE DATE:  10/13/2021          INTERPRETATION:  Portable chest radiograph    CLINICAL INFORMATION: Chest radiograph for VQ scan    TECHNIQUE:  Portable  AP view of the chest.    COMPARISON: 10/12/2021 chest available for review.    FINDINGS:    The visualized lungs are clear of airspace consolidations or effusions. No pneumothorax.    The heart and mediastinum size and configuration are within normal limits. Uncoiled thoracic aorta..    Visualized osseous structures are intact.    IMPRESSION:   No radiographic evidence of active chest disease.    --- End of Report ---    < end of copied text >  < from: NM Pulmonary Ventilation Scan (10.13.21 @ 09:55) >    EXAM:  NM PULM VENTILATION IMG                            PROCEDURE DATE:  10/13/2021          INTERPRETATION:  RADIOPHARMACEUTICAL: 1 mCi Tc-99m-DTPA by inhalation; 6.2 mCi Tc-99m-MAA, I.V.    CLINICAL INFORMATION: 76 year-old male with shortness of breath, chest pain, referred to evaluate for pulmonary embolism.    TECHNIQUE:  Ventilation and perfusion images of the lungs were obtained following administration of Tc-99m-DTPA and Tc-99m-MAA. Images were obtained in the anterior, posterior, both lateral, and all 4 oblique projections. This study is interpreted in conjunction with a chest radiograph from 10/12/2021.    COMPARISON: None    FINDINGS: There is heterogeneous distribution of radiotracer in the lungs on both the ventilation and perfusion images. There are matched defects in the apical posterior segment of the left upper lobe and anterior basal segment of the left lower lobe with no corresponding opacities on chest x-ray.    IMPRESSION: Abnormal ventilation/perfusion lung scan.Low probability of pulmonary embolus.      --- End of Report ---    < from: Transthoracic Echocardiogram (10.13.21 @ 07:18) >    Patient name: MILVIA SILVER  YOB: 1945   Age: 76 (M)   MR#: [id number]  Study Date: 10/13/2021  Location: [address] [place], [place] [address]ASonographer: Juan Alberto Campos RDCS  Study quality: Technically difficult  Referring Physician:  ERA SAUNDERS MD  Blood Pressure: 127/76 mmHg  Height: 165 cm  Weight: 86 kg  BSA: 1.9 m2  ------------------------------------------------------------------------    PROCEDURE: Transthoracic echocardiogram with 2-D, M-Mode  and complete spectral and color flow Doppler.  INDICATION: Syncope and Collapse (R55)  HISTORY:  ------------------------------------------------------------------------  DIMENSIONS:  Dimensions:     Normal Values:  LA:     3.5 cm    2.0 - 4.0 cm  Ao:     3.9 cm    2.0 - 3.8 cm  SEPTUM: 1.1 cm    0.6 - 1.2 cm  PWT:    0.9 cm    0.6 - 1.1 cm  LVIDd:  4.9 cm    3.0 - 5.6 cm  LVIDs:  3.5 cm    1.8 - 4.0 cm      Derived Variables:  LVMI: 91 g/m2  RWT: 0.36  Ejection Fraction Visual Estimate: >55 %    ------------------------------------------------------------------------  OBSERVATIONS:  Mitral Valve: Normal mitral valve. Trace mitral  regurgitation.  Aortic Root: Normal aortic root.  Aortic Valve: Normal trileaflet aortic valve. Trace aortic  regurgitation.  Left Atrium: Normal left atrium.  LA volume index = 21  cc/m2.  Left Ventricle: Endocardium not well visualized; grossly  normal left ventricular systolic function based on limited  views.   Segmental wall motion could not be assessed.  Normal left ventricular internal dimensions and wall  thicknesses. Grade I diastolic dysfunction (Impaired  relaxation, mild).  Right Heart: Normal right atrium. Normal right ventricular  size and function. There is mild tricuspid regurgitation.  There is trace pulmonic regurgitation.  Pericardium/PleuraNormal pericardium with no pericardial  effusion.  Hemodynamic: Incomplete tricuspid regurgitation jet  precludes accurate assessment of pulmonary artery systolic  pressure.  ------------------------------------------------------------------------  CONCLUSIONS:  1. Trace mitral regurgitation.  2.  Trace aortic regurgitation.  3. Normal left ventricular internal dimensions and wall  thicknesses.  4. Endocardium not well visualized; grossly normal left  ventricular systolic function based on limited views.  Segmental wall motion could not be assessed.  5. Grade I diastolic dysfunction (Impaired relaxation,  mild).  6. Normal right ventricular size and function.    < end of copied text >      < end of copied text >      Imaging Personally Reviewed:  [ ] YES  [ ] NO

## 2021-10-15 VITALS
TEMPERATURE: 98 F | RESPIRATION RATE: 18 BRPM | DIASTOLIC BLOOD PRESSURE: 70 MMHG | OXYGEN SATURATION: 97 % | HEART RATE: 78 BPM | SYSTOLIC BLOOD PRESSURE: 111 MMHG

## 2021-10-15 LAB
ANION GAP SERPL CALC-SCNC: 6 MMOL/L — SIGNIFICANT CHANGE UP (ref 5–17)
BUN SERPL-MCNC: 34 MG/DL — HIGH (ref 7–18)
CALCIUM SERPL-MCNC: 8.8 MG/DL — SIGNIFICANT CHANGE UP (ref 8.4–10.5)
CHLORIDE SERPL-SCNC: 111 MMOL/L — HIGH (ref 96–108)
CO2 SERPL-SCNC: 24 MMOL/L — SIGNIFICANT CHANGE UP (ref 22–31)
CREAT SERPL-MCNC: 1.98 MG/DL — HIGH (ref 0.5–1.3)
CULTURE RESULTS: SIGNIFICANT CHANGE UP
GLUCOSE BLDC GLUCOMTR-MCNC: 196 MG/DL — HIGH (ref 70–99)
GLUCOSE BLDC GLUCOMTR-MCNC: 225 MG/DL — HIGH (ref 70–99)
GLUCOSE SERPL-MCNC: 194 MG/DL — HIGH (ref 70–99)
HCT VFR BLD CALC: 39.2 % — SIGNIFICANT CHANGE UP (ref 39–50)
HGB BLD-MCNC: 12.7 G/DL — LOW (ref 13–17)
MCHC RBC-ENTMCNC: 27 PG — SIGNIFICANT CHANGE UP (ref 27–34)
MCHC RBC-ENTMCNC: 32.4 GM/DL — SIGNIFICANT CHANGE UP (ref 32–36)
MCV RBC AUTO: 83.4 FL — SIGNIFICANT CHANGE UP (ref 80–100)
NRBC # BLD: 0 /100 WBCS — SIGNIFICANT CHANGE UP (ref 0–0)
PLATELET # BLD AUTO: 155 K/UL — SIGNIFICANT CHANGE UP (ref 150–400)
POTASSIUM SERPL-MCNC: 4.5 MMOL/L — SIGNIFICANT CHANGE UP (ref 3.5–5.3)
POTASSIUM SERPL-SCNC: 4.5 MMOL/L — SIGNIFICANT CHANGE UP (ref 3.5–5.3)
RBC # BLD: 4.7 M/UL — SIGNIFICANT CHANGE UP (ref 4.2–5.8)
RBC # FLD: 13.3 % — SIGNIFICANT CHANGE UP (ref 10.3–14.5)
SODIUM SERPL-SCNC: 141 MMOL/L — SIGNIFICANT CHANGE UP (ref 135–145)
SPECIMEN SOURCE: SIGNIFICANT CHANGE UP
WBC # BLD: 6.16 K/UL — SIGNIFICANT CHANGE UP (ref 3.8–10.5)
WBC # FLD AUTO: 6.16 K/UL — SIGNIFICANT CHANGE UP (ref 3.8–10.5)

## 2021-10-15 RX ORDER — MUPIROCIN 20 MG/G
1 OINTMENT TOPICAL
Refills: 0 | Status: DISCONTINUED | OUTPATIENT
Start: 2021-10-15 | End: 2021-10-15

## 2021-10-15 RX ORDER — CHLORHEXIDINE GLUCONATE 213 G/1000ML
1 SOLUTION TOPICAL
Refills: 0 | Status: DISCONTINUED | OUTPATIENT
Start: 2021-10-15 | End: 2021-10-15

## 2021-10-15 RX ADMIN — Medication 250 MILLIGRAM(S): at 05:51

## 2021-10-15 RX ADMIN — LIDOCAINE 1 PATCH: 4 CREAM TOPICAL at 11:38

## 2021-10-15 RX ADMIN — LIDOCAINE 1 PATCH: 4 CREAM TOPICAL at 07:01

## 2021-10-15 RX ADMIN — AMLODIPINE BESYLATE 10 MILLIGRAM(S): 2.5 TABLET ORAL at 05:51

## 2021-10-15 RX ADMIN — Medication 1000 UNIT(S): at 11:23

## 2021-10-15 RX ADMIN — LIDOCAINE 1 PATCH: 4 CREAM TOPICAL at 01:07

## 2021-10-15 RX ADMIN — INSULIN GLARGINE 30 UNIT(S): 100 INJECTION, SOLUTION SUBCUTANEOUS at 08:15

## 2021-10-15 RX ADMIN — Medication 2: at 11:39

## 2021-10-15 RX ADMIN — FINASTERIDE 5 MILLIGRAM(S): 5 TABLET, FILM COATED ORAL at 11:23

## 2021-10-15 RX ADMIN — APIXABAN 5 MILLIGRAM(S): 2.5 TABLET, FILM COATED ORAL at 05:51

## 2021-10-15 RX ADMIN — Medication 1: at 08:15

## 2021-10-15 RX ADMIN — Medication 50 MILLIGRAM(S): at 05:51

## 2021-10-18 LAB
CULTURE RESULTS: SIGNIFICANT CHANGE UP
CULTURE RESULTS: SIGNIFICANT CHANGE UP
GLUCOSE BLDC GLUCOMTR-MCNC: 212 MG/DL — HIGH (ref 70–99)
SPECIMEN SOURCE: SIGNIFICANT CHANGE UP
SPECIMEN SOURCE: SIGNIFICANT CHANGE UP

## 2021-10-26 PROCEDURE — 99285 EMERGENCY DEPT VISIT HI MDM: CPT

## 2021-10-26 PROCEDURE — 93005 ELECTROCARDIOGRAM TRACING: CPT

## 2021-10-26 PROCEDURE — 78579 LUNG VENTILATION IMAGING: CPT

## 2021-10-26 PROCEDURE — 86803 HEPATITIS C AB TEST: CPT

## 2021-10-26 PROCEDURE — 87040 BLOOD CULTURE FOR BACTERIA: CPT

## 2021-10-26 PROCEDURE — 93306 TTE W/DOPPLER COMPLETE: CPT

## 2021-10-26 PROCEDURE — 85652 RBC SED RATE AUTOMATED: CPT

## 2021-10-26 PROCEDURE — 71045 X-RAY EXAM CHEST 1 VIEW: CPT

## 2021-10-26 PROCEDURE — 81001 URINALYSIS AUTO W/SCOPE: CPT

## 2021-10-26 PROCEDURE — 86769 SARS-COV-2 COVID-19 ANTIBODY: CPT

## 2021-10-26 PROCEDURE — 80048 BASIC METABOLIC PNL TOTAL CA: CPT

## 2021-10-26 PROCEDURE — 85025 COMPLETE CBC W/AUTO DIFF WBC: CPT

## 2021-10-26 PROCEDURE — 85027 COMPLETE CBC AUTOMATED: CPT

## 2021-10-26 PROCEDURE — 84484 ASSAY OF TROPONIN QUANT: CPT

## 2021-10-26 PROCEDURE — 36415 COLL VENOUS BLD VENIPUNCTURE: CPT

## 2021-10-26 PROCEDURE — 87635 SARS-COV-2 COVID-19 AMP PRB: CPT

## 2021-10-26 PROCEDURE — 87641 MR-STAPH DNA AMP PROBE: CPT

## 2021-10-26 PROCEDURE — 71046 X-RAY EXAM CHEST 2 VIEWS: CPT

## 2021-10-26 PROCEDURE — 87086 URINE CULTURE/COLONY COUNT: CPT

## 2021-10-26 PROCEDURE — 87640 STAPH A DNA AMP PROBE: CPT

## 2021-10-26 PROCEDURE — 82962 GLUCOSE BLOOD TEST: CPT

## 2021-10-26 PROCEDURE — 83036 HEMOGLOBIN GLYCOSYLATED A1C: CPT

## 2021-10-26 PROCEDURE — 85379 FIBRIN DEGRADATION QUANT: CPT

## 2021-10-26 PROCEDURE — 80053 COMPREHEN METABOLIC PANEL: CPT

## 2021-10-26 PROCEDURE — 83880 ASSAY OF NATRIURETIC PEPTIDE: CPT

## 2021-12-21 ENCOUNTER — APPOINTMENT (OUTPATIENT)
Dept: SURGICAL ONCOLOGY | Facility: CLINIC | Age: 76
End: 2021-12-21
Payer: MEDICARE

## 2021-12-21 VITALS
HEIGHT: 65 IN | HEART RATE: 98 BPM | WEIGHT: 190 LBS | SYSTOLIC BLOOD PRESSURE: 135 MMHG | DIASTOLIC BLOOD PRESSURE: 86 MMHG | BODY MASS INDEX: 31.65 KG/M2

## 2021-12-21 VITALS — TEMPERATURE: 93.7 F

## 2021-12-21 PROCEDURE — 99214 OFFICE O/P EST MOD 30 MIN: CPT

## 2021-12-21 NOTE — PHYSICAL EXAM
[Normal] : supple, no neck mass and thyroid not enlarged [Normal Groin Lymph Nodes] : normal groin lymph nodes [Normal] : oriented to person, place and time, with appropriate affect [de-identified] : Normal Popliteal nodes [de-identified] : left 3rd toes amputation site is clear

## 2021-12-21 NOTE — REASON FOR VISIT
Last refill was denied by insurance.  PA done and denied.  Contact insurance again, they stated allowable amount is #56 for 7 days.  With next refill will attempt this amount to see if covered.  Patient paid out of pocket this refill. 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

## 2021-12-21 NOTE — ASSESSMENT
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

## 2021-12-21 NOTE — CONSULT LETTER
[Dear  ___] : Dear  [unfilled], [Courtesy Letter:] : I had the pleasure of seeing your patient, [unfilled], in my office today. [Please see my note below.] : Please see my note below. [Consult Closing:] : Thank you very much for allowing me to participate in the care of this patient.  If you have any questions, please do not hesitate to contact me. [Sincerely,] : Sincerely, [FreeTextEntry1] : I will keep you informed of my follow-up. [FreeTextEntry3] : Hamilton Altamirano MD FACS\par Chief of Surgical Oncology\par \par

## 2021-12-21 NOTE — HISTORY OF PRESENT ILLNESS
[de-identified] : Travis Greenberg is a 76 year old male who presents today for a follow up visit. 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 Final pathology revealed a diagnosis of invasive squamous cell carcinoma, well-differentiated, tumor extends to the reticular dermis, lymphovascular permeation is not identified, margins of resection and underlying bone are uninvolved.\par \par Pertinent History: \par Nail bed wound, left 3rd toe biopsy 1/26/21: Highly atypical squamous cells in a minute fragment of ulcerated tissue with fibrinopurulent exudate.\par \par Left 3rd toe, punch biopsy 2/16/21: Squamous cell carcinoma, invasive, extending to all  margins.\par \par He recently presented to both podiatry and orthopedics for evaluation of a left 3rd toe ulceration, he states this has been present for approximately 1 year. His past medical history includes BPH, DM II-insulin dependent, hypertension, inguinal hernia x 2, and hyperlipidemia.  He was recently hospitalized due to an unresponsive hypoglycemic episode. He currently receiving  wound care daily. He denies a personal or family history of cancer. \par \par Today, he states he is feeling well. He is able to ambulate well and has increased his activity level. \par \par Referring MD:  Usman Carballo MD

## 2022-06-21 ENCOUNTER — APPOINTMENT (OUTPATIENT)
Dept: SURGICAL ONCOLOGY | Facility: CLINIC | Age: 77
End: 2022-06-21
Payer: MEDICARE

## 2022-06-21 VITALS
BODY MASS INDEX: 31.32 KG/M2 | SYSTOLIC BLOOD PRESSURE: 86 MMHG | WEIGHT: 188 LBS | HEART RATE: 81 BPM | HEIGHT: 65 IN | DIASTOLIC BLOOD PRESSURE: 58 MMHG | OXYGEN SATURATION: 97 %

## 2022-06-21 VITALS — TEMPERATURE: 97 F

## 2022-06-21 PROCEDURE — 99214 OFFICE O/P EST MOD 30 MIN: CPT

## 2022-06-21 NOTE — PHYSICAL EXAM
[Normal] : supple, no neck mass and thyroid not enlarged [Normal Groin Lymph Nodes] : normal groin lymph nodes [Normal] : oriented to person, place and time, with appropriate affect [de-identified] : Normal Popliteal nodes [de-identified] : left 3rd toes amputation site is clear; recent removal of right great te nail now healing in

## 2022-06-21 NOTE — ASSESSMENT
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

## 2022-06-21 NOTE — HISTORY OF PRESENT ILLNESS
[de-identified] : Travis Greenberg is a 77 year old male who presents today for a 6 month follow up visit. 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 His past medical history includes BPH, DM II-insulin dependent, hypertension, inguinal hernia x 2, and hyperlipidemia.  He was recently hospitalized due to an unresponsive hypoglycemic episode. He currently receiving  wound care daily. He denies a personal or family history of cancer. 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 Final pathology revealed a diagnosis of invasive squamous cell carcinoma, well-differentiated, tumor extends to the reticular dermis, lymphovascular permeation is not identified, margins of resection and underlying bone are uninvolved.\par \par 12/2021-Today, he states he is feeling well. He is able to ambulate well and has increased his activity level. \par \par 6/21/22- \par \par Referring MD:  Usman Carballo MD

## 2022-12-05 NOTE — H&P ADULT - PROBLEM/PLAN-1
Detail Level: Zone Plan: Coupon given. Samples Given: Aquaphor ointment daily DISPLAY PLAN FREE TEXT

## 2022-12-27 ENCOUNTER — APPOINTMENT (OUTPATIENT)
Dept: SURGICAL ONCOLOGY | Facility: CLINIC | Age: 77
End: 2022-12-27

## 2022-12-27 VITALS
DIASTOLIC BLOOD PRESSURE: 68 MMHG | WEIGHT: 198 LBS | HEIGHT: 65 IN | SYSTOLIC BLOOD PRESSURE: 106 MMHG | BODY MASS INDEX: 32.99 KG/M2 | HEART RATE: 94 BPM

## 2022-12-27 PROCEDURE — 99214 OFFICE O/P EST MOD 30 MIN: CPT

## 2022-12-27 NOTE — HISTORY OF PRESENT ILLNESS
[de-identified] : Travis Greenberg is a 77 year old male who presents today for a 6 month follow up visit. \par \par Nail bed wound, left 3rd toe biopsy 1/26/21: Highly atypical squamous cells in a minute fragment of ulcerated tissue with fibrinopurulent exudate.\par \par Left 3rd toe, punch biopsy 2/16/21: Squamous cell carcinoma, invasive, extending to all  margins.\par He recently presented to both podiatry and orthopedics for evaluation of a left 3rd toe ulceration, he states this has been present for approximately 1 year. \par His past medical history includes BPH, DM II-insulin dependent, hypertension, inguinal hernia x 2, and hyperlipidemia.  He was recently hospitalized due to an unresponsive hypoglycemic episode. He currently receiving  wound care daily. He denies a personal or family history of cancer. 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 Final pathology revealed a diagnosis of invasive squamous cell carcinoma, well-differentiated, tumor extends to the reticular dermis, lymphovascular permeation is not identified, margins of resection and underlying bone are uninvolved.\par \par \par \par \par \par Referring MD:  Usman Carballo MD

## 2022-12-27 NOTE — ASSESSMENT
[FreeTextEntry1] : IMP:\par EMILI\par s/p radical resection of squamous cell carcinoma of left third toe  on 5/17/21\par PATH: invasive squamous cell carcinoma, well-differentiated, tumor extends to the reticular dermis, lympho-vascular permeation is not identified, margins of resection and underlying bone are uninvolved.\par \par \par PLAN:\par RTO Q6 months\par podiatry follow-up for foot care of right great toe

## 2022-12-27 NOTE — PHYSICAL EXAM
[Normal] : supple, no neck mass and thyroid not enlarged [Normal Groin Lymph Nodes] : normal groin lymph nodes [Normal] : oriented to person, place and time, with appropriate affect [de-identified] : Normal Popliteal nodes [de-identified] : left 3rd toes amputation site is clear; recent removal of right great te nail now healing in

## 2023-06-27 ENCOUNTER — APPOINTMENT (OUTPATIENT)
Dept: SURGICAL ONCOLOGY | Facility: CLINIC | Age: 78
End: 2023-06-27
Payer: MEDICARE

## 2024-03-19 ENCOUNTER — APPOINTMENT (OUTPATIENT)
Dept: SURGICAL ONCOLOGY | Facility: CLINIC | Age: 79
End: 2024-03-19
Payer: MEDICARE

## 2024-03-19 VITALS
DIASTOLIC BLOOD PRESSURE: 74 MMHG | SYSTOLIC BLOOD PRESSURE: 123 MMHG | HEART RATE: 123 BPM | WEIGHT: 200 LBS | BODY MASS INDEX: 33.32 KG/M2 | HEIGHT: 65 IN | OXYGEN SATURATION: 99 %

## 2024-03-19 DIAGNOSIS — C44.729 SQUAMOUS CELL CARCINOMA OF SKIN OF LEFT LOWER LIMB, INCLUDING HIP: ICD-10-CM

## 2024-03-19 PROCEDURE — 99214 OFFICE O/P EST MOD 30 MIN: CPT

## 2024-03-19 NOTE — HISTORY OF PRESENT ILLNESS
[de-identified] : Mr. MILVIA SILVER is a 79 year old man here for a follow-up visit.   Nail bed wound, left 3rd toe biopsy 1/26/21: Highly atypical squamous cells in a minute fragment of ulcerated tissue with fibrinopurulent exudate.  Left 3rd toe, punch biopsy 2/16/21: Squamous cell carcinoma, invasive, extending to all  margins. He recently presented to both podiatry and orthopedics for evaluation of a left 3rd toe ulceration, he states this has been present for approximately 1 year.  His past medical history includes BPH, DM II-insulin dependent, hypertension, inguinal hernia x 2, and hyperlipidemia.  He was recently hospitalized due to an unresponsive hypoglycemic episode. He currently receiving  wound care daily. He denies a personal or family history of cancer.   He is s/p radical resection of squamous cell carcinoma of left third toe on 5/17/21. Final pathology revealed a diagnosis of invasive squamous cell carcinoma, well-differentiated, tumor extends to the reticular dermis, lymphovascular permeation is not identified, margins of resection and underlying bone are uninvolved.  Referring MD:  Usman Carballo MD

## 2024-03-19 NOTE — CONSULT LETTER
[Courtesy Letter:] : I had the pleasure of seeing your patient, [unfilled], in my office today. [Dear  ___] : Dear  [unfilled], [Consult Closing:] : Thank you very much for allowing me to participate in the care of this patient.  If you have any questions, please do not hesitate to contact me. [Please see my note below.] : Please see my note below. [FreeTextEntry1] : He will be contacting you for continued foot care. [Sincerely,] : Sincerely, [FreeTextEntry3] : Hamilton Altamirano MD FACS Chief of Surgical Oncology

## 2024-03-19 NOTE — PHYSICAL EXAM
[de-identified] : Normal Popliteal nodes [de-identified] : left 3rd toe amputation site is clear; no other foot lesions bilaterally

## 2024-03-19 NOTE — ASSESSMENT
[FreeTextEntry1] : IMP: EMILI s/p radical resection of squamous cell carcinoma of left third toe on 5/17/21 PATH: invasive squamous cell carcinoma, well-differentiated, tumor extends to the reticular dermis, lympho-vascular permeation is not identified, margins of resection and underlying bone are uninvolved.  PLAN: RTO Q6 months podiatry follow-up for foot care

## 2024-06-05 NOTE — H&P PST ADULT - NEGATIVE CARDIOVASCULAR SYMPTOMS
Altered Nutrition Related Lab Values
no chest pain/no palpitations/no dyspnea on exertion/no orthopnea/no paroxysmal nocturnal dyspnea/no peripheral edema/no claudication

## 2024-07-30 NOTE — PATIENT PROFILE ADULT - 
ADDITIONAL INFORMATION
Provider: BRITTANY JANE    Caller: PATIENT    Relationship to Patient: SELF    Pharmacy: [id number]    Phone Number: [phone]    Reason for Call: PATIENT STATES OVER THE LAST 4 DAYS HE HAS HAD SEVERE HEADACHES. STATES HE HAS ALSO HAD BALANCE ISSUES & SORE MUSCLES WITH THIS HEADACHE. STATES THE MEDICATIONS AREN'T SEEMING TO HELP NOW, THEY HAD BEEN WORKING BEFORE. SCHEDULED FOR A SOONER APPT BUT WOULD LIKE TO SEE WHAT TO DO UNTIL THEN. PLEASE ADVISE, THANK YOU.  
Spoke with patient is having vomiting, dizziness, balance issues, gait changes along with headache.  Recommended to balance UA should in the emergency room.  Patient states understanding.  
pt had booster shot today

## 2024-09-17 ENCOUNTER — APPOINTMENT (OUTPATIENT)
Dept: SURGICAL ONCOLOGY | Facility: CLINIC | Age: 79
End: 2024-09-17
Payer: MEDICARE

## 2024-09-17 ENCOUNTER — NON-APPOINTMENT (OUTPATIENT)
Age: 79
End: 2024-09-17

## 2024-09-17 VITALS
BODY MASS INDEX: 32.32 KG/M2 | WEIGHT: 194 LBS | HEART RATE: 81 BPM | DIASTOLIC BLOOD PRESSURE: 67 MMHG | SYSTOLIC BLOOD PRESSURE: 101 MMHG | HEIGHT: 65 IN | OXYGEN SATURATION: 97 %

## 2024-09-17 DIAGNOSIS — C44.729 SQUAMOUS CELL CARCINOMA OF SKIN OF LEFT LOWER LIMB, INCLUDING HIP: ICD-10-CM

## 2024-09-17 PROCEDURE — 99214 OFFICE O/P EST MOD 30 MIN: CPT

## 2024-09-17 NOTE — PHYSICAL EXAM
[Normal] : supple, no neck mass and thyroid not enlarged [Normal Groin Lymph Nodes] : normal groin lymph nodes [Normal] : oriented to person, place and time, with appropriate affect [de-identified] : Normal popliteal nodes [de-identified] : left third toe amputation site without any local recurrence

## 2024-09-17 NOTE — HISTORY OF PRESENT ILLNESS
[de-identified] : Mr. MILVIA SILVER is a 79 year old man here for a follow-up visit.   Nail bed wound, left 3rd toe biopsy 1/26/21: Highly atypical squamous cells in a minute fragment of ulcerated tissue with fibrinopurulent exudate.  Left 3rd toe, punch biopsy 2/16/21: Squamous cell carcinoma, invasive, extending to all  margins. He recently presented to both podiatry and orthopedics for evaluation of a left 3rd toe ulceration, he states this has been present for approximately 1 year.  His past medical history includes BPH, DM II-insulin dependent, hypertension, inguinal hernia x 2, and hyperlipidemia.  He was recently hospitalized due to an unresponsive hypoglycemic episode. He currently receiving  wound care daily. He denies a personal or family history of cancer.   He is s/p radical resection of squamous cell carcinoma of left third toe on 5/17/21. Final pathology revealed a diagnosis of invasive squamous cell carcinoma, well-differentiated, tumor extends to the reticular dermis, lymphovascular permeation is not identified, margins of resection and underlying bone are uninvolved.  Referring MD:  Usman Carballo MD

## 2024-09-17 NOTE — HISTORY OF PRESENT ILLNESS
[de-identified] : Mr. MILVIA SILVER is a 79 year old man here for a follow-up visit.   Nail bed wound, left 3rd toe biopsy 1/26/21: Highly atypical squamous cells in a minute fragment of ulcerated tissue with fibrinopurulent exudate.  Left 3rd toe, punch biopsy 2/16/21: Squamous cell carcinoma, invasive, extending to all  margins. He recently presented to both podiatry and orthopedics for evaluation of a left 3rd toe ulceration, he states this has been present for approximately 1 year.  His past medical history includes BPH, DM II-insulin dependent, hypertension, inguinal hernia x 2, and hyperlipidemia.  He was recently hospitalized due to an unresponsive hypoglycemic episode. He currently receiving  wound care daily. He denies a personal or family history of cancer.   He is s/p radical resection of squamous cell carcinoma of left third toe on 5/17/21. Final pathology revealed a diagnosis of invasive squamous cell carcinoma, well-differentiated, tumor extends to the reticular dermis, lymphovascular permeation is not identified, margins of resection and underlying bone are uninvolved.  Referring MD:  Usman Carballo MD

## 2024-09-17 NOTE — PHYSICAL EXAM
[Normal] : supple, no neck mass and thyroid not enlarged [Normal Groin Lymph Nodes] : normal groin lymph nodes [Normal] : oriented to person, place and time, with appropriate affect [de-identified] : Normal popliteal nodes [de-identified] : left third toe amputation site without any local recurrence

## 2025-03-05 NOTE — H&P ADULT - NS MD HP PULSE POSTERIOR
[facility] calling physical therapist states  BP today was 162/98. Patient is asymptomatic.I spoke to patient she states she was in pain during physical therapy this morning.Patient rechecked her BP with nurse on phone and it is 133/79.  Patient currently on Metoprolol 50 mg   right normal/left normal

## 2025-03-18 ENCOUNTER — APPOINTMENT (OUTPATIENT)
Dept: SURGICAL ONCOLOGY | Facility: CLINIC | Age: 80
End: 2025-03-18

## 2025-03-25 ENCOUNTER — APPOINTMENT (OUTPATIENT)
Dept: SURGICAL ONCOLOGY | Facility: CLINIC | Age: 80
End: 2025-03-25

## 2025-03-25 DIAGNOSIS — C44.729 SQUAMOUS CELL CARCINOMA OF SKIN OF LEFT LOWER LIMB, INCLUDING HIP: ICD-10-CM
